# Patient Record
Sex: FEMALE | Race: WHITE
[De-identification: names, ages, dates, MRNs, and addresses within clinical notes are randomized per-mention and may not be internally consistent; named-entity substitution may affect disease eponyms.]

---

## 2017-06-12 ENCOUNTER — HOSPITAL ENCOUNTER (INPATIENT)
Dept: HOSPITAL 10 - OBT | Age: 32
LOS: 4 days | Discharge: HOME | DRG: 781 | End: 2017-06-16
Attending: OBSTETRICS & GYNECOLOGY | Admitting: OBSTETRICS & GYNECOLOGY
Payer: MEDICAID

## 2017-06-12 VITALS
BODY MASS INDEX: 35.9 KG/M2 | HEIGHT: 62 IN | WEIGHT: 195.11 LBS | WEIGHT: 195.11 LBS | HEIGHT: 62 IN | BODY MASS INDEX: 35.9 KG/M2

## 2017-06-12 DIAGNOSIS — N20.0: ICD-10-CM

## 2017-06-12 DIAGNOSIS — Z3A.30: ICD-10-CM

## 2017-06-12 DIAGNOSIS — O26.613: Primary | ICD-10-CM

## 2017-06-12 LAB
ADD SCAN DIFF: NO
ADD UMIC: NO
ALBUMIN SERPL-MCNC: 3.6 G/DL (ref 3.3–4.9)
ALBUMIN/GLOB SERPL: 1.12 {RATIO}
ALP SERPL-CCNC: 114 IU/L (ref 42–121)
ALT SERPL-CCNC: 50 IU/L (ref 13–69)
ANION GAP SERPL CALC-SCNC: 13 MMOL/L (ref 8–16)
AST SERPL-CCNC: 86 IU/L (ref 15–46)
BASOPHILS # BLD AUTO: 0.1 10^3/UL (ref 0–0.1)
BASOPHILS NFR BLD: 0.3 % (ref 0–2)
BILIRUB DIRECT SERPL-MCNC: 0 MG/DL (ref 0–0.2)
BILIRUB SERPL-MCNC: 0.3 MG/DL (ref 0.2–1.3)
BUN SERPL-MCNC: 7 MG/DL (ref 7–20)
CALCIUM SERPL-MCNC: 9.2 MG/DL (ref 8.4–10.2)
CHLORIDE SERPL-SCNC: 106 MMOL/L (ref 97–110)
CO2 SERPL-SCNC: 22 MMOL/L (ref 21–31)
COLOR UR: (no result)
CREAT SERPL-MCNC: 0.5 MG/DL (ref 0.44–1)
EOSINOPHIL # BLD: 0.2 10^3/UL (ref 0–0.5)
EOSINOPHIL NFR BLD: 1.2 % (ref 0–7)
ERYTHROCYTE [DISTWIDTH] IN BLOOD BY AUTOMATED COUNT: 14.3 % (ref 11.5–14.5)
GLOBULIN SER-MCNC: 3.2 G/DL (ref 1.3–3.2)
GLUCOSE SERPL-MCNC: 89 MG/DL (ref 70–220)
GLUCOSE UR STRIP-MCNC: NEGATIVE %
HCT VFR BLD CALC: 38.3 % (ref 37–47)
HGB BLD-MCNC: 13.3 G/DL (ref 12–16)
KETONES UR STRIP.AUTO-MCNC: NEGATIVE MG/DL
LYMPHOCYTES # BLD AUTO: 2.1 10^3/UL (ref 0.8–2.9)
LYMPHOCYTES NFR BLD AUTO: 13.8 % (ref 15–51)
MCH RBC QN AUTO: 30.4 PG (ref 29–33)
MCHC RBC AUTO-ENTMCNC: 34.7 G/DL (ref 32–37)
MCV RBC AUTO: 87.4 FL (ref 82–101)
MONOCYTES # BLD: 1 10^3/UL (ref 0.3–0.9)
MONOCYTES NFR BLD: 6.7 % (ref 0–11)
NEUTROPHILS # BLD: 11.9 10^3/UL (ref 1.6–7.5)
NEUTROPHILS NFR BLD AUTO: 77.6 % (ref 39–77)
NITRITE UR QL STRIP.AUTO: NEGATIVE
NRBC # BLD MANUAL: 0 10^3/UL (ref 0–0)
NRBC BLD QL: 0 /100WBC (ref 0–0)
PLATELET # BLD: 188 10^3/UL (ref 140–415)
PMV BLD AUTO: 11.6 FL (ref 7.4–10.4)
POTASSIUM SERPL-SCNC: 3.8 MMOL/L (ref 3.5–5.1)
PROT SERPL-MCNC: 6.8 G/DL (ref 6.1–8.1)
RBC # BLD AUTO: 4.38 10^6/UL (ref 4.2–5.4)
RBC # UR AUTO: NEGATIVE /UL
SODIUM SERPL-SCNC: 137 MMOL/L (ref 135–144)
UR BILIRUBIN (DIP): NEGATIVE
UR CLARITY: CLEAR
UR TOTAL PROTEIN (DIP): NEGATIVE
UROBILINOGEN UR STRIP-ACNC: (no result) (ref 0.1–1)
WBC # BLD AUTO: 15.4 10^3/UL (ref 4.8–10.8)
WBC # UR STRIP: NEGATIVE /UL

## 2017-06-12 PROCEDURE — 76818 FETAL BIOPHYS PROFILE W/NST: CPT

## 2017-06-12 PROCEDURE — 85025 COMPLETE CBC W/AUTO DIFF WBC: CPT

## 2017-06-12 PROCEDURE — 76817 TRANSVAGINAL US OBSTETRIC: CPT

## 2017-06-12 PROCEDURE — 80053 COMPREHEN METABOLIC PANEL: CPT

## 2017-06-12 PROCEDURE — 76775 US EXAM ABDO BACK WALL LIM: CPT

## 2017-06-12 PROCEDURE — 76705 ECHO EXAM OF ABDOMEN: CPT

## 2017-06-12 PROCEDURE — 81003 URINALYSIS AUTO W/O SCOPE: CPT

## 2017-06-12 PROCEDURE — 87086 URINE CULTURE/COLONY COUNT: CPT

## 2017-06-12 PROCEDURE — 36415 COLL VENOUS BLD VENIPUNCTURE: CPT

## 2017-06-12 PROCEDURE — G0463 HOSPITAL OUTPT CLINIC VISIT: HCPCS

## 2017-06-12 PROCEDURE — 76815 OB US LIMITED FETUS(S): CPT

## 2017-06-12 PROCEDURE — 81001 URINALYSIS AUTO W/SCOPE: CPT

## 2017-06-12 PROCEDURE — 76816 OB US FOLLOW-UP PER FETUS: CPT

## 2017-06-12 RX ADMIN — Medication SCH TAB: at 10:33

## 2017-06-12 RX ADMIN — PYRIDOXINE HYDROCHLORIDE SCH MLS/HR: 100 INJECTION, SOLUTION INTRAMUSCULAR; INTRAVENOUS at 22:46

## 2017-06-12 RX ADMIN — PYRIDOXINE HYDROCHLORIDE SCH MLS/HR: 100 INJECTION, SOLUTION INTRAMUSCULAR; INTRAVENOUS at 18:01

## 2017-06-12 RX ADMIN — PYRIDOXINE HYDROCHLORIDE SCH MLS/HR: 100 INJECTION, SOLUTION INTRAMUSCULAR; INTRAVENOUS at 10:07

## 2017-06-12 NOTE — RADRPT
PROCEDURE:   US Abdomen (right upper quadrant). 

 

CLINICAL INDICATION:    Abdominal pain.

 

TECHNIQUE:   Multiple real-time longitudinal and transverse images of the right upper quadrant of th
e abdomen were acquired utilizing a curved array transducer. Images were reviewed on a high-resoluti
on PACS workstation. 

 

COMPARISON:   None 

 

FINDINGS:

 

The liver is normal in size and demonstrates normal  echogenicity.  No focal intrahepatic mass is id
entified.  The gallbladder contains sludge.  There is no pericholecystic fluid or gallbladder wall t
hickening. No intra or extrahepatic biliary dilatation is seen.  The common bile duct measures 4.5 m
m in maximal dimension. The portal and hepatic veins are patent demonstrating normal directional niya
w. The visualized portions of the pancreas are unremarkable with obscuration of the tail of the panc
reas.  No free fluid is identified. 

 

The right kidney measures 9.9 cm in length.  There is normal echogenicity within the right kidney.  
There is no perinephric fluid collection.  No hydronephrosis, mass, or calculus is seen.   

 

IMPRESSION:

 

Small amount of sludge within the gallbladder.  Otherwise, unremarkable right upper quadrant ultraso
und. 

 

  

RPTAT: HH

_____________________________________________ 

.Brittney Mac MD, MD           Date    Time 

Electronically viewed and signed by .Brittney Mac MD, MD on 06/12/2017 05:28 

 

D:  06/12/2017 05:28  T:  06/12/2017 05:28

.G/

## 2017-06-12 NOTE — HP
Date/Time of Note


Date/Time of Note


DATE: 17 


TIME: 06:54





OB - History


Hx of Present Pregnancy


:  1


Para:  0


Prenatal Care:  Good Care


Obstetrical Complications:  None


Medical Complications:  None





Past Family/Social History


*


Past Medical, Surgical, Family and Obstetric Histories reviewed from prenatal 

chart.





OB  Admission Exam


Physical Exam


Abdomen:  WNL


Extremities:  Normal


Cervical Dilatation:  None


Effacement:  0%


Fetal Heart Rate:  140's


Accelerations:  Accelerations Present


Decelerations:  No Decelerations


Varibility:  Moderate


Contractions on Admission:  None


Last 72 hours Lab Results


 CBC & BMP


17 04:20











 Liver Function








Test


  17


04:20


 


Alanine Aminotransferase


(ALT/SGPT) 50  


 


 


Albumin 3.6  


 


Alkaline Phosphatase 114  


 


Aspartate Amino Transf


(AST/SGOT) 86  H


 


 


Direct Bilirubin 0.00  


 


Total Protein 6.8  











OB  Assessment/Plan


Reason for admission:  observation


Plan:  Expectant Management


Other plan:


+CVA tenderness


WBC 15


Suspected Pyelonephritis





1.Ancef


2.IV hydration


3.Prenatalogy consult











YANET MCCABE M.D. 2017 06:56

## 2017-06-12 NOTE — RADRPT
PROCEDURE:   US Renal 

 

CLINICAL INDICATION:   Back pain rule out kidney stone 

 

TECHNIQUE:   Multiple sonographic images of the kidneys and bladder were obtained.  Evaluation of th
e kidneys and bladder was performed as well with gray scale and color and Doppler evaluation using a
 curved array transducer.  The images were reviewed on a high-resolution PACS workstation. 

 

COMPARISON:   Comparison previous right upper quadrant abdominal sonogram done earlier on the same d
ate 

 

FINDINGS:

There is a linear focus of increased echotexture measuring 0.8 cm in length with posterior acoustic 
shadowing suspicious for calcification which could be vascular or could represent a nonobstructing n
ephrolith.  No mass or hydronephrosis is evident.  There is mild left pelvocaliectasis with no intra
 renal calcification or mass identified. 

 

The bladder appears unremarkable. There is a single fetus in a cephalic presentation noted.

 

IMPRESSION: 

1.  And 8 mm linear focus of increased echotexture with posterior acoustic shadowing is seen within 
the mid pole of the right kidney which was not appreciable on the previous study.  This could repres
ent a nonobstructing nephrolith or possibly a vascular calcification.

2.  Mild left pelvocaliectasis with no stone or mass seen within the left kidney.

3.  The patient is pregnant with the fetus cephalic in presentation.

4.  The bladder appears unremarkable.

_____________________________________________ 

Physician Chris           Date    Time 

Electronically viewed and signed by Physician Chris on 06/12/2017 13:33 

 

D:  06/12/2017 13:33  T:  06/12/2017 13:33

/

## 2017-06-12 NOTE — RADRPT
PROCEDURE:   OB ultrasound for biophysical profile 

 

CLINICAL INDICATION:    labor

 

TECHNIQUE:   Multiple sonographic images of the pelvis were obtained.  Transabdominal views of the g
ravid uterus are available for review.  The images were reviewed on a PACS workstation.  

 

COMPARISON:   None 

 

FINDINGS:

 

Fetal breathing movement = 2/2

Fetal tone = 2/2

Fetal motion = 2/2

AMITA = 2/2

 

AMITA = 11.3 cm

Single live intrauterine pregnancy with fetal cardiac activity of 134 bpm.  Fetal position is cephal
ic.  The placenta is fundal. The cervix is closed with a length of 4.3 cm.

 

IMPRESSION:

 

1.  Single live intrauterine gestation.  

2.  Biophysical profile = 8/8.

3.  AMITA = 11.3 cm. 

4.  The cervix is closed with a length of 4.3 cm.

 

 

RPTAT: HH

_____________________________________________ 

.Brittney Mac MD, MD           Date    Time 

Electronically viewed and signed by .Brittney Mac MD, MD on 2017 05:24 

 

D:  2017 05:24  T:  2017 05:24

.G/

## 2017-06-12 NOTE — RADRPT
PROCEDURE:   US OB. 

 

CLINICAL INDICATION:   Size and dates

 

TECHNIQUE:   Multiple sonographic images of the pelvis were obtained.  Transabdominal imaging only w
as performed.  The images were reviewed on a PACS workstation. 

 

COMPARISON:   No prior studies are available for comparison. 

 

FINDINGS:

 

There is a single live intrauterine gestation.  Cardiac activity is present with 137 beats per minut
e. 

Fetal position is cephalic. 

 

Measurements were made in order to determine fetal age. The results are as follows:

BPD = 8.08 cm

HC =  28.53 cm

AC =  25.79 cm

FL =  5.67 cm.

Estimated gestational age of approximately 30 weeks 6 days.  

The estimated date of delivery is 08/15/2017.  

The EFW = 1532 g, 3.7 %ile.  

 

The placenta is fundal. There is no evidence for an abruption or placenta previa.

 

 

IMPRESSION:

 

1.  Single live intrauterine gestation of approximately 30 weeks 6 days, by ultrasound criteria.  

2.  The estimated date of delivery is 08/15/2017.

3.  The estimated fetal weight is 1532 g, 3.7 %ile. 

 

 

 

RPTAT: HH

_____________________________________________ 

.Brittney Mac MD, MD           Date    Time 

Electronically viewed and signed by .rBittney Mac MD, MD on 06/12/2017 05:26 

 

D:  06/12/2017 05:26  T:  06/12/2017 05:26

.G/

## 2017-06-13 RX ADMIN — PYRIDOXINE HYDROCHLORIDE SCH MLS/HR: 100 INJECTION, SOLUTION INTRAMUSCULAR; INTRAVENOUS at 02:07

## 2017-06-13 RX ADMIN — PYRIDOXINE HYDROCHLORIDE SCH MLS/HR: 100 INJECTION, SOLUTION INTRAMUSCULAR; INTRAVENOUS at 19:12

## 2017-06-13 RX ADMIN — Medication SCH TAB: at 08:56

## 2017-06-13 RX ADMIN — OXYCODONE HYDROCHLORIDE AND ACETAMINOPHEN PRN TAB: 10; 325 TABLET ORAL at 21:21

## 2017-06-13 RX ADMIN — PYRIDOXINE HYDROCHLORIDE SCH MLS/HR: 100 INJECTION, SOLUTION INTRAMUSCULAR; INTRAVENOUS at 10:17

## 2017-06-13 NOTE — CONS
Date/Time of Note


Date/Time of Note


DATE: 6/13/17 


TIME: 16:18





Assessment/Plan


Assessment/Plan


Additional Assessment/Plan


30 yo F 32 weeks into her first pregnancy presents with 3 days of abd pain now 

improving. Etiology unclear. UA not consistent with infection. Gallbladder US 

with sludge but no current evidence of cholecystitis or duct compromise. 

Consider possible recent passage of GB stone? Imaging also revealed a small 

nonobstructing kidney stone, consider this as cause of patient's discomfort?





PLAN


stop abx as no evidence of bacterial infection


strain urine to look for kidney stone


repeat CBC with AM labs





POC dw pt in detail


hospitalist service will cont to follow





Consultation Date/Type/Reason


Admit Date/Time


Jun 12, 2017 at 07:02


Type of Consultation:  hospitalist


Reason for Consultation


abd pain





Hx of Present Illness


30 yo F at 32 weeks of pregnancy presented 6.12 with c/o several days of R 

sided abd pain. Pt reports pain began at 2am 6.10. Pain mostly in R lower 

quadrant also radiates to low back at times. No fevers or chills. No nausea or 

vomiting. Had some constipation but this has since resolved. Also states that 

during episodes of particularly intense pain that her hands swell as well. No 

new food exposures recently. No dysuria or hematuria.


10p ROS neg except as per HPI





Past Medical History


PMHx: none aside from pregnancy


Meds: PNV


Soc Hx: lives in the community





Exam/Review of Systems


Vital Signs


Vitals











 Intake and Output   


 


 6/12/17 6/12/17 6/13/17





 15:00 23:00 07:00


 


Intake Total 675 ml 2100 ml 1040 ml


 


Output Total  1500 ml 1600 ml


 


Balance 675 ml 600 ml -560 ml











Exam


nad, pleasant, sitting up in bed


MMM


EOMI


rrr no mrg


lungs clear


abd gravid, minimal TTP over RLQ, no rebound or guarding. no CVA tenderness bl


no le edema


no rashes





labs reviewed, WBCs 15s. Abd US with GB sludge





Results


Result Diagram:  


6/12/17 0420                                                                   

             6/12/17 0420








Medications


Medications





 Current Medications


Lactated Ringer's 1,000 ml @  125 mls/hr Q8H IV  Last administered on 6/13/17at 

10:17; Admin Dose 125 MLS/HR;  Start 6/12/17 at 06:46


Cefazolin Sodium/ Dextrose (Ancef 2 Gm/50 ml (Pmx)) 50 ml @  100 mls/hr Q8 IV  

Last administered on 6/13/17at 14:34; Admin Dose 100 MLS/HR;  Start 6/12/17 at 

07:00


Prenat Multivit/ /Iron/Folic Ac (Prenatal S) 1 tab DAILY PO  Last 

administered on 6/13/17at 08:56; Admin Dose 1 TAB;  Start 6/12/17 at 09:00


Acetaminophen (Tylenol Tab) 650 mg Q4H  PRN PO PAIN AND OR ELEVATED TEMP Last 

administered on 6/13/17at 07:40; Admin Dose 650 MG;  Start 6/12/17 at 07:00





Procedures


Procedures


UA benign


KIA with small nonobstructive stone











NEIL HERNANDEZ MD Jun 13, 2017 16:18

## 2017-06-13 NOTE — QN
Documentation


Comment


patient seen and evaluated


complains of occasion upper/lower abdominal pain


no n/v, sob,


vs stable


ab gravid mild upper/lower abdominal tenderness (pt report improvement since 

admission)


extr no edema no calf tenderness





a/ iup at 30 wks ga, sludge in gullbladder, elevated wbc





p/ f/u cbc in am


social work consult











JENNYFER FRANCIS MD Jun 13, 2017 19:15

## 2017-06-13 NOTE — CONS
Date/Time of Note


Date/Time of Note


DATE: 17 


TIME: 16:21





Consultation Date/Type/Reason


Admit Date/Time


2070


Hospital OB consult


Reason for Consultation


This patient is a 31 years old  1 para 0 estimated date of confinement 

of 1977 which make her about 38 weeks.


She came to the hospital yesterday complaining of right side pain for the past 

3 days


No nausea vomiting diarrhea no vaginal bleeding no dysuria


On general examination she is well-developed well-nourished  lady 

pregnant at midterm in no acute distress except for the complain of the right 

side and back pain


Her abdomen is soft fundus is soft fetal heart tone is normal with good 

variability no deceleration





 Current Medications








 Medications


  (Trade)  Dose


 Ordered  Sig/Ji


 Route


 PRN Reason  Start Time


 Stop Time Status Last Admin


Dose Admin


 


 Acetaminophen 650


 mg  650 mg  ONCE  ONCE


 PO


   17 06:00


 17 06:01 DC 17 05:45


650 MG


 


 Lactated Ringer's  1,000 ml @ 


 125 mls/hr  Q8H


 IV


   17 06:46


    17 10:17


125 MLS/HR


 


 Cefazolin Sodium/


 Dextrose


  (Ancef 2 Gm/50


 ml (Pmx))  50 ml @ 


 100 mls/hr  Q8


 IV


   17 07:00


 17 16:17 DC 17 14:34


100 MLS/HR


 


 Prenat Multivit/


 [place]/Iron/Folic


 Ac


  (Prenatal S)  1 tab  DAILY


 PO


   17 09:00


    17 08:56


1 TAB


 


 Acetaminophen


  (Tylenol Tab)  650 mg  Q4H  PRN


 PO


 PAIN AND OR ELEVATED TEMP  17 07:00


    17 07:40


650 MG








Constitutional:  No chills, No diaphoresis, No disoriented, No febrile, No 

improved, No no complaints, No other, No poor po, No requiring IVF, No 

requiring O2


Eyes:  other (No Camryn), 


   No discharge, No no complaints, No pain, No redness, No visual change


ENT:  No bleeding, No congestion, No discharge, No dysphagia, No no complaints, 

No other, No pain, No sore throat


Respiratory:  other (Chest is clear no rales), 


   No cough, No no complaints, No pain, No pleuritic pain, No shortness of 

breath, No sputum, No wheezing


Cardiovascular:  No chest pain, No edema, No lightheadedness, No no complaints, 

No orthopenea, No other, No palpitations, No paroxysmal nocturnal dyspnea


Gastrointestinal:  other (As I mentioned no diarrhea no vomiting), 


   No blood, No constipation, No decreased appetite, No diarrhea, No flatus, No 

nausea, No no complaints, No pain, No passing stool, No vomiting


Genitourinary:  other (No vaginal bleeding no discharge), 


   No bleeding, No discharge, No dysuria, No flank pain, No hematuria, No no 

complaints


Musculoskeletal:  other, swelling


Skin:  rash (No rash no petechiae), 


   No bruising, No erythema, No laceration, No no complaints, No other, No 

pruritis, No skin lesions


Neurologic:  other (Knee-jerk reflexes normal), 


   No confusion, No dizziness, No focal-weakness, No headache, No no complaints

, No seizure, No syncope


Endocrine:  No dry skin, No no complaints, No other, No polydypsia, No polyuria

, No temp intolerance


Lymphatic:  No adenopathy, No lymphadema, No no complaints, No other, No tender 

nodes


Additional Comments


On the studies that was done here ,her ultrasound  report was a single live 

intrauterine gestation with heart rate of 137 her biophysical profile was 

reported  estimated fetal weight was 1532 g and the gestational age was 

appropriate for the date which is 30 weeks and 6 days 


AMITA 11.3 , biophysical profile  the cervix was 4.3 cm long and closed,


On renal study by ultrasound report is mild left pelvocaliectasis with no stone 

or mass within the left kidney bladder was normal


On abdominal ultrasound the liver was normal size with normal echogenicity no 

focal intrahepatic mass ,small amount of sludge within the gallbladder 

otherwise unremarkable right upper quadrant ultrasound


Ultrasound study regarding her pregnancy was completely within normal range





Impression: Normal intrauterine pregnancy of 30 weeks


Rule out cholecystitis or any other related condition


We will request internal medicine consult





Exam/Review of Systems


Vital Signs


Vitals











 Intake and Output   


 


 17





 15:00 23:00 07:00


 


Intake Total 675 ml 2100 ml 1040 ml


 


Output Total  1500 ml 1600 ml


 


Balance 675 ml 600 ml -560 ml











Results


Result Diagram:  


17 0420                                                                   

             17 0420








Medications


Medications





 Current Medications


Lactated Ringer's (Lr) 1,000 ml @  125 mls/hr Q8H IV  Last administered on  10:17; Admin Dose 125 MLS/HR;  Start 17 at 06:46


Prenat Multivit/ /Iron/Folic Ac (Prenatal S) 1 tab DAILY PO  Last 

administered on  08:56; Admin Dose 1 TAB;  Start 17 at 09:00


Acetaminophen (Tylenol Tab) 650 mg Q4H  PRN PO PAIN AND OR ELEVATED TEMP Last 

administered on  07:40; Admin Dose 650 MG;  Start 17 at 07:00











HIPOLITO COPPOLA MD 2017 16:38

## 2017-06-14 LAB
ADD SCAN DIFF: NO
ADD UMIC: YES
BACTERIA #/AREA URNS HPF: (no result) /[HPF]
BASOPHILS # BLD AUTO: 0 10^3/UL (ref 0–0.1)
BASOPHILS NFR BLD: 0.2 % (ref 0–2)
COLOR UR: (no result)
EOSINOPHIL # BLD: 0.2 10^3/UL (ref 0–0.5)
EOSINOPHIL NFR BLD: 1.8 % (ref 0–7)
ERYTHROCYTE [DISTWIDTH] IN BLOOD BY AUTOMATED COUNT: 14.6 % (ref 11.5–14.5)
GLUCOSE UR STRIP-MCNC: NEGATIVE %
HCT VFR BLD CALC: 36.4 % (ref 37–47)
HGB BLD-MCNC: 12.4 G/DL (ref 12–16)
KETONES UR STRIP.AUTO-MCNC: NEGATIVE MG/DL
LYMPHOCYTES # BLD AUTO: 2.2 10^3/UL (ref 0.8–2.9)
LYMPHOCYTES NFR BLD AUTO: 18.3 % (ref 15–51)
MCH RBC QN AUTO: 30.5 PG (ref 29–33)
MCHC RBC AUTO-ENTMCNC: 34.1 G/DL (ref 32–37)
MCV RBC AUTO: 89.7 FL (ref 82–101)
MONOCYTES # BLD: 0.8 10^3/UL (ref 0.3–0.9)
MONOCYTES NFR BLD: 6.5 % (ref 0–11)
NEUTROPHILS # BLD: 8.9 10^3/UL (ref 1.6–7.5)
NEUTROPHILS NFR BLD AUTO: 72.8 % (ref 39–77)
NITRITE UR QL STRIP.AUTO: NEGATIVE
NRBC # BLD MANUAL: 0 10^3/UL (ref 0–0)
NRBC BLD QL: 0 /100WBC (ref 0–0)
PLATELET # BLD: 152 10^3/UL (ref 140–415)
PMV BLD AUTO: 11.9 FL (ref 7.4–10.4)
RBC # BLD AUTO: 4.06 10^6/UL (ref 4.2–5.4)
RBC # UR AUTO: (no result) /UL
RBC #/AREA URNS HPF: (no result) /HPF
SQUAMOUS #/AREA URNS HPF: (no result) /[HPF]
UR BILIRUBIN (DIP): NEGATIVE
UR CLARITY: CLEAR
UR TOTAL PROTEIN (DIP): NEGATIVE
UROBILINOGEN UR STRIP-ACNC: (no result) (ref 0.1–1)
WBC # BLD AUTO: 12.2 10^3/UL (ref 4.8–10.8)
WBC # UR STRIP: (no result) /UL

## 2017-06-14 RX ADMIN — OXYCODONE HYDROCHLORIDE AND ACETAMINOPHEN PRN TAB: 10; 325 TABLET ORAL at 12:24

## 2017-06-14 RX ADMIN — PYRIDOXINE HYDROCHLORIDE SCH MLS/HR: 100 INJECTION, SOLUTION INTRAMUSCULAR; INTRAVENOUS at 03:39

## 2017-06-14 RX ADMIN — Medication SCH TAB: at 09:29

## 2017-06-14 RX ADMIN — OXYCODONE HYDROCHLORIDE AND ACETAMINOPHEN PRN TAB: 10; 325 TABLET ORAL at 02:15

## 2017-06-14 RX ADMIN — OXYCODONE HYDROCHLORIDE AND ACETAMINOPHEN PRN TAB: 10; 325 TABLET ORAL at 16:04

## 2017-06-14 RX ADMIN — OXYCODONE HYDROCHLORIDE AND ACETAMINOPHEN PRN TAB: 10; 325 TABLET ORAL at 06:30

## 2017-06-14 NOTE — CONS
DATE OF ADMISSION: 2017

DATE OF CONSULTATION:  2017

 

 

 

REASON FOR CONSULTATION:  Abdominal pain.

 

HISTORY OF PRESENT ILLNESS:  The patient is a 31-year-old female,  1, para 0, who is 30 weeks
 pregnant who presented initially complaining of abdominal pain of approximately 5 days in duration.
  She describes the pain as being located in the epigastric area, radiating towards the right upper 
and right lower quadrants and suprapubic area.  She also reports some vomiting; however, she states 
that she has been vomiting pretty much throughout her pregnancy.  She denies any diarrhea or constip
ation or fever/chills.  On arrival, she was found to have a white blood cell count of 15.4.  She was
 therefore admitted and started on IV antibiotics.  An abdominal ultrasound was done, which showed a
 small amount of sludge within the gallbladder and a possible right kidney stone, otherwise negative
.  The patient has continued to complain of abdominal pain, which is controlled with oral Percocet. 
 She has been tolerating a diet.  She has been afebrile since she has been here.  Otherwise, accordi
ng to the patient, her pregnancy has been progressing normally.

 

PAST MEDICAL HISTORY:  Otherwise noncontributory.

 

PAST SURGICAL HISTORY:  Otherwise negative.

 

MEDICATIONS:  Please refer to medication reconciliation.

 

ALLERGIES:  NO KNOWN DRUG ALLERGIES.

 

SOCIAL HISTORY:  The patient does not smoke, drink, or do any illicit drugs.

 

REVIEW OF SYSTEMS:  A 14-point review of systems was conducted and was negative except for that whic
h was mentioned in the HPI.

 

PHYSICAL EXAMINATION:

VITAL SIGNS:  The patient is afebrile and hemodynamically stable.

GENERAL APPEARANCE:  She is a well-developed, well-nourished, obese  female who is awake, al
ert, and oriented x3 and in no acute distress at this time.

HEENT:  Pupils are equal and reactive to light and accommodation.  There is no scleral icterus.  Ski
n is not jaundiced.

NECK:  Supple without JVD.

CARDIOVASCULAR:  S1, S2.  Regular rate and rhythm.  No murmurs appreciated.

RESPIRATORY:  Clear to auscultation bilaterally.

ABDOMEN:  Soft, gravid.  Bowel sounds are present.  There is mild epigastric tenderness to palpation
 with right lower quadrant and right upper quadrant tenderness to palpation.  There is no involuntar
y guarding or evidence of diffuse peritonitis.

EXTREMITIES:  Do not exhibit any signs of cyanosis, edema, or clubbing.

 

IMAGING STUDIES:  Abdominal ultrasound is as mentioned in HPI with a small amount of sludge in the g
allbladder, otherwise unremarkable.  Renal ultrasound shows a possible 8 mm stone in the mid pole of
 the right kidney.

 

RECOMMENDATIONS:  This is a 31-year-old female who is 30 weeks pregnant with abdominal pain.

 

Given the duration of the patient's symptoms and clinical findings, my suspicion for acute appendici
tis is low at this time, however, it cannot be completely ruled out.  The patient states she has bee
n better since she has been admitted to the hospital.  She is tolerating a diet.  She has been afebr
ile and her leukocytosis has been improving.  I would continue with serial abdominal exams and close
 observation.  Her urinalysis was negative though her urine culture grew mixed gram-positive jenna. 
 This is likely contaminate and consideration should be given to repeat of clean catch urine.  Other
 causes of the patient's pain may include gastritis, biliary dyskinesia, gastrointestinal viral synd
gagan, ligamentous or musculoskeletal pain, etc.  I do not believe surgical intervention is required 
at this time.  I will continue with careful observation of this patient.  If the patient's symptoms 
did not improve, then MRI versus a CT scan should be considered for further evaluation.  Both of the
se have been proven to be safe in pregnancy during all trimesters through multiple studies.

 

The above was discussed with the patient and the nurse at the bedside.  I ensured that all the patie
nt's questions were answered.  Further recommendations will be made based on the patient's clinical 
course.

 

 

Dictated By: JENNYFER TEMPLE/EDWIGE

DD:    2017 19:00:33

DT:    2017 20:49:12

Conf#: [id number]

DID#:  [id number]

CC: JENNYFER FRANCIS MD;*EndCC*

## 2017-06-14 NOTE — RADRPT
PROCEDURE:   Obstetrical ultrasound

 

CLINICAL INDICATION:   possible iugr

 

TECHNIQUE:   Multiple sonographic images of the pelvis were obtained.  The images were reviewed on a
 PACS workstation. 

 

COMPARISON:   None

 

FINDINGS:

The cervix is not well visualized.

There is a single viable intrauterine gestation.  

Cardiac activity is present with 131 beats per minute. 

There is a vertex presentation. 

 

The placenta is fundal. There is no evidence for an abruption or placenta previa.

There is a normal amount of amniotic fluid with an AMITA = 9.4 cm.

 

Measurements were made in order to determine fetal age. The results are as follows (cm):

BPD =7.88

HC   =28.35

AC   =26.03

FL    =5.88

Estimated gestational age by ultrasound of approximately 30 weeks, 6 days.  

The estimated date of delivery by ultrasound is 08/17/2017.  

Estimated gestational age by LMP of approximately 30 weeks, 2 days.

The estimated date of delivery by LMP is 08/21/2017.

 

EFW = 1591  grams (45th percentile)

 

 

IMPRESSION:

Single viable intrauterine gestation of approximately 30 weeks, 6 days .  

The estimated date of delivery is 08/17/2017 .

Dating by ultrasound is within 4 days of dating by LMP.

 

Cephalic presentation.

 

Normal AMITA.

 

Estimated fetal weight is in the 45th percentile.

 

 

RPTAT: EE

_____________________________________________ 

Physician Catina           Date    Time 

Electronically viewed and signed by Physician Catina on 06/14/2017 17:24 

 

D:  06/14/2017 17:24  T:  06/14/2017 17:24

KARMEN/

## 2017-06-14 NOTE — CONS
Date/Time of Note


Date/Time of Note


DATE: 6/14/17 


TIME: 16:56





Assessment/Plan


Assessment/Plan


Additional Assessment/Plan


30 yo F at 32 weeks of pregnancy presented 6.12 with c/o several days of R 

sided abd pain. Pt reports pain began at 2am 6.10. Pain mostly in R lower 

quadrant also radiates to low back at times. No fevers or chills. No nausea or 

vomiting. Had some constipation but this has since resolved. Also states that 

during episodes of particularly intense pain that her hands swell as well. No 

new food exposures recently. No dysuria or hematuria. 





Pain now improving but etiology still unclear. 


As pt tolerating PO I have stopped IVFs


As no infectious focus found, I have stopped antibiotics


Consider monitoring pt for additional 24 hours and if stable consider discharge.


Will recheck CBC and CMP in AM





Consultation Date/Type/Reason


Admit Date/Time


Jun 12, 2017 at 07:02


Initial Consult Date





Type of Consultation:  hospitalist





24 HR Interval Summary


Free Text/Dictation


Pt reports abd improving but still present at night





Exam/Review of Systems


Vital Signs


Vitals











 Intake and Output   


 


 6/13/17 6/13/17 6/14/17





 15:00 23:00 07:00


 


Intake Total 1230 ml 800 ml 1815 ml


 


Output Total 600 ml 250 ml 1650 ml


 


Balance 630 ml 550 ml 165 ml











Exam


nad, sitting up in bed


no mrg


lungs clear


gravid, mild ttp RLQ


no le edema


WBCs improving





Results


Result Diagram:  


6/14/17 1311                                                                   

             6/12/17 0420





Results 24 hrs





Laboratory Tests








Test


  6/14/17


13:11


 


White Blood Count 12.2  #H


 


Red Blood Count 4.06  L


 


Hemoglobin 12.4  


 


Hematocrit 36.4  L


 


Mean Corpuscular Volume 89.7  


 


Mean Corpuscular Hemoglobin 30.5  


 


Mean Corpuscular Hemoglobin


Concent 34.1  


 


 


Red Cell Distribution Width 14.6  H


 


Platelet Count 152  


 


Mean Platelet Volume 11.9  H


 


Neutrophils % 72.8  


 


Lymphocytes % 18.3  


 


Monocytes % 6.5  


 


Eosinophils % 1.8  


 


Basophils % 0.2  


 


Nucleated Red Blood Cells % 0.0  


 


Neutrophils # 8.9  H


 


Lymphocytes # 2.2  


 


Monocytes # 0.8  


 


Eosinophils # 0.2  


 


Basophils # 0.0  


 


Nucleated Red Blood Cells # 0.0  











Medications


Medications





 Current Medications


Prenat Multivit/ [place]/Iron/Folic Ac (Prenatal S) 1 tab DAILY PO  Last 

administered on 6/14/17at 09:29; Admin Dose 1 TAB;  Start 6/12/17 at 09:00


Acetaminophen (Tylenol Tab) 650 mg Q4H  PRN PO PAIN AND OR ELEVATED TEMP Last 

administered on 6/13/17at 07:40; Admin Dose 650 MG;  Start 6/12/17 at 07:00


Oxycodone/ Acetaminophen (Endocet (10/ 325)) 1 tab Q4H  PRN PO PAIN Last 

administered on 6/14/17at 16:04; Admin Dose 1 TAB;  Start 6/13/17 at 20:00


Al Hydrox/Mg Hydrox/Simethicone (Mag-Al Plus) 30 ml Q4H  PRN PO 

GASTROINTESTINAL UPSET Last administered on 6/14/17at 16:05; Admin Dose 30 ML;  

Start 6/13/17 at 20:00











NEIL HERNANDEZ MD Jun 14, 2017 16:57

## 2017-06-14 NOTE — RADRPT
PROCEDURE:   US OB biophysical profile. 

 

CLINICAL INDICATION:    Fetal evaluation

 

TECHNIQUE:   Multiple sonographic images of the pelvis were obtained.  The images were reviewed on a
 PACS workstation. 

 

COMPARISON:   Obstetrical ultrasound from 06/12/2017 

 

FINDINGS:

 

There is a single viable intrauterine gestation.  Cardiac activity is present with 134 beats per min
[place]. 

There is a vertex presentation. 

The placenta is fundal.   There is no evidence of placental abruption.

There is a normal amount of amniotic fluid with an AMITA = 9.4 cm.

 

Biophysical profile:

Fetal movement 2/2

Fetal tone 2/2.

Fetal breathing 2/2 

AMITA 2/2

 

Total 8/8 

 

RPTAT: AA . 

 

IMPRESSION:

Normal biophysical profile.

_____________________________________________ 

Physician Catina           Date    Time 

Electronically viewed and signed by Physician Catina on 06/14/2017 17:23 

 

D:  06/14/2017 17:23  T:  06/14/2017 17:23

KARMEN/

## 2017-06-15 LAB
ADD SCAN DIFF: NO
ALBUMIN SERPL-MCNC: 3.6 G/DL (ref 3.3–4.9)
ALBUMIN/GLOB SERPL: 1.28 {RATIO}
ALP SERPL-CCNC: 124 IU/L (ref 42–121)
ALT SERPL-CCNC: 44 IU/L (ref 13–69)
ANION GAP SERPL CALC-SCNC: 10 MMOL/L (ref 8–16)
AST SERPL-CCNC: 72 IU/L (ref 15–46)
BASOPHILS # BLD AUTO: 0 10^3/UL (ref 0–0.1)
BASOPHILS NFR BLD: 0.2 % (ref 0–2)
BILIRUB DIRECT SERPL-MCNC: 0 MG/DL (ref 0–0.2)
BILIRUB SERPL-MCNC: 0.3 MG/DL (ref 0.2–1.3)
BUN SERPL-MCNC: 6 MG/DL (ref 7–20)
CALCIUM SERPL-MCNC: 9 MG/DL (ref 8.4–10.2)
CHLORIDE SERPL-SCNC: 105 MMOL/L (ref 97–110)
CO2 SERPL-SCNC: 25 MMOL/L (ref 21–31)
CREAT SERPL-MCNC: 0.56 MG/DL (ref 0.44–1)
EOSINOPHIL # BLD: 0.3 10^3/UL (ref 0–0.5)
EOSINOPHIL NFR BLD: 2.4 % (ref 0–7)
ERYTHROCYTE [DISTWIDTH] IN BLOOD BY AUTOMATED COUNT: 14.6 % (ref 11.5–14.5)
GLOBULIN SER-MCNC: 2.8 G/DL (ref 1.3–3.2)
GLUCOSE SERPL-MCNC: 74 MG/DL (ref 70–220)
HCT VFR BLD CALC: 37.3 % (ref 37–47)
HGB BLD-MCNC: 12.5 G/DL (ref 12–16)
LYMPHOCYTES # BLD AUTO: 2.5 10^3/UL (ref 0.8–2.9)
LYMPHOCYTES NFR BLD AUTO: 21.7 % (ref 15–51)
MCH RBC QN AUTO: 29.8 PG (ref 29–33)
MCHC RBC AUTO-ENTMCNC: 33.5 G/DL (ref 32–37)
MCV RBC AUTO: 89 FL (ref 82–101)
MONOCYTES # BLD: 0.8 10^3/UL (ref 0.3–0.9)
MONOCYTES NFR BLD: 7.2 % (ref 0–11)
NEUTROPHILS # BLD: 7.7 10^3/UL (ref 1.6–7.5)
NEUTROPHILS NFR BLD AUTO: 68.1 % (ref 39–77)
NRBC # BLD MANUAL: 0 10^3/UL (ref 0–0)
NRBC BLD QL: 0 /100WBC (ref 0–0)
PLATELET # BLD: 152 10^3/UL (ref 140–415)
PMV BLD AUTO: 11.8 FL (ref 7.4–10.4)
POTASSIUM SERPL-SCNC: 3.8 MMOL/L (ref 3.5–5.1)
PROT SERPL-MCNC: 6.4 G/DL (ref 6.1–8.1)
RBC # BLD AUTO: 4.19 10^6/UL (ref 4.2–5.4)
SODIUM SERPL-SCNC: 136 MMOL/L (ref 135–144)
WBC # BLD AUTO: 11.3 10^3/UL (ref 4.8–10.8)

## 2017-06-15 RX ADMIN — DOCUSATE SODIUM SCH MG: 100 CAPSULE, LIQUID FILLED ORAL at 20:21

## 2017-06-15 RX ADMIN — Medication SCH TAB: at 09:13

## 2017-06-15 NOTE — PN
Date/Time of Note


Date/Time of Note


DATE: 6/15/17 


TIME: 00:21





OB Subjective


Subjective


Subjective


Patient denies any fever or chills.  Denies any leaking of fluid, vaginal 

bleeding or decreased fetal movement.  She denies any urinary symptoms.  Still 

complaining of pain in the right upper and right mid abdomen with radiation to 

the back.  Patient receiving Percocet for pain that improves her pain.  She is 

concerned about possibility of appendicitis.  Had vomiting last night.  Today 

he had some nausea but could be able to tolerate diet.


Has been seen by GI  This morning.





OB Objective


Objective


Objective


General appearance: Alert and oriented 4.  Patient appears to be in mild to 

moderate distress


Abdomen: Soft, gravid, fundal height consistent with gestational age.


Tenderness in the right upper quadrant and in the right mid quadrant of the 

abdomen noted.  there is moderate rebound tenderness, no guarding, no rigidity 

no evidence of acute abdomen.


No uterine tenderness extremities: No calf tenderness, no click, no cords 

palpable


NST: Category 1


Occasional rare contractions or irritability noted patient denies feeling





 Hematology - 72 Hrs








Test


  6/12/17


04:20 6/14/17


13:11


 


White Blood Count


  15.410^3/ul


(4.8-10.8)  H 12.210^3/ul


(4.8-10.8)  #H


 


Red Blood Count


  4.3810^6/ul


(4.20-5.40) 4.0610^6/ul


(4.20-5.40)  L


 


Hemoglobin


  13.3g/dl


(12.0-16.0) 12.4g/dl


(12.0-16.0)


 


Hematocrit


  38.3%


(37.0-47.0) 36.4%


(37.0-47.0)  L


 


Mean Corpuscular Volume


  87.4fl


(82.0-101.0) 89.7fl


(82.0-101.0)


 


Mean Corpuscular Hemoglobin


  30.4pg


(29.0-33.0) 30.5pg


(29.0-33.0)


 


Mean Corpuscular Hemoglobin


Concent 34.7g/dl


(32.0-37.0) 34.1g/dl


(32.0-37.0)


 


Red Cell Distribution Width


  14.3%


(11.5-14.5) 14.6%


(11.5-14.5)  H


 


Platelet Count


  96653^3/UL


(140-415) 35241^3/UL


(140-415)


 


Mean Platelet Volume


  11.6fl


(7.4-10.4)  H 11.9fl


(7.4-10.4)  H


 


Neutrophils %


  77.6%


(39.0-77.0)  H 72.8%


(39.0-77.0)


 


Lymphocytes %


  13.8%


(15.0-51.0)  L 18.3%


(15.0-51.0)


 


Monocytes %


  6.7%


(0.0-11.0) 6.5%


(0.0-11.0)


 


Eosinophils %


  1.2% (0.0-7.0)


  1.8% (0.0-7.0)


 


 


Basophils %


  0.3% (0.0-2.0)


  0.2% (0.0-2.0)


 


 


Nucleated Red Blood Cells %


  0.0/100WBC


(0.0-0.0) 0.0/100WBC


(0.0-0.0)


 


Neutrophils #


  11.910^3/ul


(1.6-7.5)  H 8.910^3/ul


(1.6-7.5)  H


 


Lymphocytes #


  2.110^3/ul


(0.8-2.9) 2.210^3/ul


(0.8-2.9)


 


Monocytes #


  1.010^3/ul


(0.3-0.9)  H 0.810^3/ul


(0.3-0.9)


 


Eosinophils #


  0.210^3/ul


(0.0-0.5) 0.210^3/ul


(0.0-0.5)


 


Basophils #


  0.110^3/ul


(0.0-0.1) 0.010^3/ul


(0.0-0.1)


 


Nucleated Red Blood Cells #


  0.010^3/ul


(0.0-0.0) 0.010^3/ul


(0.0-0.0)








Chemistry








Test


  6/12/17


04:20


 


Sodium Level


  137mmol/L


(135-144)


 


Potassium Level


  3.8mmol/L


(3.5-5.1)


 


Chloride Level


  106mmol/L


()


 


Carbon Dioxide Level


  22mmol/L


(21-31)


 


Anion Gap 13 (8-16)  


 


Blood Urea Nitrogen 7mg/dl (7-20)  


 


Creatinine


  0.50mg/dl


(0.44-1.00)


 


Glucose Level


  89mg/dl


()


 


Calcium Level


  9.2mg/dl


(8.4-10.2)


 


Total Bilirubin


  0.3mg/dl


(0.2-1.3)


 


Direct Bilirubin


  0.00mg/dl


(0.00-0.20)


 


Indirect Bilirubin


  0.3mg/dl


(0-1.1)


 


Aspartate Amino Transf


(AST/SGOT) 86IU/L (15-46)


H


 


Alanine Aminotransferase


(ALT/SGPT) 50IU/L (13-69)


 


 


Alkaline Phosphatase


  114IU/L


()


 


Total Protein


  6.8g/dl


(6.1-8.1)


 


Albumin


  3.6g/dl


(3.3-4.9)


 


Globulin


  3.20g/dl


(1.3-3.2)


 


Albumin/Globulin Ratio 1.12  











OB  Assessment/Plan


Other Assessment:


IUP at 30 weeks and 2 days


Right and mid abdominal pain, leukocytosis, biliary sludge noted in the 

ultrasound


No pericholecystic fluid, no thickening of the gallbladder wall.  No evidence 

of cholecystitis


Slight elevated LFTs, unclear etiology


No evidence of pyelonephritis


Due to location of the pain as well as displacement of the appendix during 

pregnancy recommended and discussed with patient to have a surgery consultation 

to rule out for appendicitis


I have consulted with general surgery  who kindly agreed to see the 

patient today


Patient also with elevated hCG and AFP during pregnancy at risk of IUGR and 

preeclampsia.  Had ultrasound with [facility] perinatology which recommended to repeat 

her ultrasound every 4 weeks.


Growth ultrasound at this facility was done, concern for IUGR based on EFW 

percentile


I have discussed this with radiology who confirmed the estimated fetal weight 

by ultrasound at this facility 3.7 percentile this possibly not accurate since 

each individual measurements including BPD, HC and AC are within the range of 

gestational age


Clinical RONEL was inaccurately placed in the system.  I discussed this with Dr. Moody team who advised me to inform radiology about accurate clinical dating.  

Recommended by radiology to repeat the test.  Repeat ultrasound showed 

appropriate growth.  Per Dr. Moody's recommendation if the growth is normal only 

needs to have a follow-up after discharge from the hospital with high-risk 

pregnancy.  No requirement for Doppler at this time unless any concern for IUGR


Patient needs to have a follow-up as outpatient after discharge from the 

hospital for growth ultrasound with [facility] perinatology


Awaiting general surgery consultation for the recommendation


We will continue to monitor the patient in-house


Expectant management











WILMER BACA MD [place] 15, 2017 00:30

## 2017-06-15 NOTE — QN
Documentation


Comment


Patient at 30+weeks of gestation with right upper quadrant pain


Patient stable and afebrile


Leukocytosis improving


WBC within normal limits


Continue with present management


Consider DC home tomorrow if cleared by hospitalist











NIK ACEVEDO MD [place] 15, 2017 19:05

## 2017-06-15 NOTE — EN
Date/Time of Note


Date/Time of Note


DATE: 6/15/17 


TIME: 19:29





Event Note Medicine


Medicine Event Note


Brief hospitalist consult service follow up note


pt not physically seen today but notes and labs reviewed


WBCs improving, abd pain improving per notes


defer decision re imaging to primary team


I will personally assess pt tomorrow


Neil Felipe MD


hospitalist











NEIL FELIPE MD [place] 15, 2017 19:30

## 2017-06-15 NOTE — PN
Date/Time of Note


Date/Time of Note


DATE: 6/15/17 


TIME: 17:05





Assessment/Plan


Lines/Catheters


IV Catheter Type (from [facility]):  Peripheral IV





Assessment/Plan


Assessment/Plan


31-year-old female who is 30 weeks pregnant with right-sided abdominal pain.


* Remains afebrile.  Leukocytosis improving.  


* Given the duration of the patient's symptoms and clinical findings, my 

suspicion for acute appendicitis remains low at this time.


* I do not believe surgical intervention is required at this time.  


* If the patient's symptoms do not improve, then MRI versus a CT scan should be 

considered for further evaluation.  Both of these have been proven to be safe 

in pregnancy during all trimesters through multiple studies.


* Continue management per OB/GYN


 


The above was discussed with the patient and the nurse at the bedside.  I 

ensured that all the patient's questions were answered.  Further 

recommendations will be made based on the patient's clinical course.





Subjective


24 Hr Interval Summary


Still with intermittent right-sided abdominal pain that is controlled with 

Percocet.  Tolerating diet and denies nausea.  Afebrile.





Exam/Review of Systems


Vital Signs


Vitals











 Intake and Output   


 


 6/14/17 6/14/17 6/15/17





 15:00 23:00 07:00


 


Intake Total 625 ml  


 


Output Total  500 ml 


 


Balance 625 ml -500 ml 











Exam


Free Text/Dictation


GENERAL APPEARANCE:  She is a well-developed, well-nourished, obese  

female who is awake, alert, and oriented x3 and in no acute distress at this 

time.  She is observed ambulating from the bathroom to her bed without 

difficulty.


CARDIOVASCULAR:  S1, S2.  Regular rate and rhythm.  No murmurs appreciated.


RESPIRATORY:  Clear to auscultation bilaterally.


ABDOMEN:  Soft, gravid.  Bowel sounds are present. Right lower quadrant and 

right upper quadrant tenderness to palpation, which is mildly improved on 

examination from yesterday.  There is no involuntary guarding or evidence of 

diffuse peritonitis.


EXTREMITIES:  Do not exhibit any signs of cyanosis, edema, or clubbing.





Results


Result Diagram:  


6/15/17 0527                                                                   

             6/15/17 0527














JENNYFER NOWAK MD [place] 15, 2017 17:10

## 2017-06-16 LAB
ADD SCAN DIFF: NO
ERYTHROCYTE [DISTWIDTH] IN BLOOD BY AUTOMATED COUNT: 14.4 % (ref 11.5–14.5)
HCT VFR BLD CALC: 37.7 % (ref 37–47)
HGB BLD-MCNC: 13.2 G/DL (ref 12–16)
LYMPHOCYTES # BLD AUTO: 1.2 10^3/UL (ref 0.8–2.9)
LYMPHOCYTES NFR BLD AUTO: 8 % (ref 15–51)
MCH RBC QN AUTO: 30.8 PG (ref 29–33)
MCHC RBC AUTO-ENTMCNC: 35 G/DL (ref 32–37)
MCV RBC AUTO: 87.9 FL (ref 82–101)
MONOCYTES # BLD: 0.9 10^3/UL (ref 0.3–0.9)
MONOCYTES NFR BLD: 6 % (ref 0–11)
NEUTROPHILS # BLD: 12.4 10^3/UL (ref 1.6–7.5)
NEUTROPHILS NFR BLD AUTO: 82 % (ref 39–77)
PLATELET # BLD: 130 10^3/UL (ref 140–415)
PMV BLD AUTO: 11.6 FL (ref 7.4–10.4)
RBC # BLD AUTO: 4.29 10^6/UL (ref 4.2–5.4)
TOTAL CELLS COUNTED PERCENT: 100 %
WBC # BLD AUTO: 15.1 10^3/UL (ref 4.8–10.8)

## 2017-06-16 RX ADMIN — Medication SCH TAB: at 09:08

## 2017-06-16 RX ADMIN — DOCUSATE SODIUM SCH MG: 100 CAPSULE, LIQUID FILLED ORAL at 09:08

## 2017-06-16 NOTE — CONS
Date/Time of Note


Date/Time of Note


DATE: 6/16/17 


TIME: 17:20





Consultation Date/Type/Reason


Admit Date/Time


Jun 12, 2017 at 07:02


Type of Consultation:  hospitalist





24 HR Interval Summary


Free Text/Dictation


Pt reports abd pain improving


vitals reviewed, no fevers


nad


no mrg


lung clear


abd gravid


no le edema





WBCs slightly higher


repeat UA negative





30 yo F at 32 weeks of pregnancy presented 6.12 with c/o several days of R 

sided abd pain. Pt reports pain began at 2am 6.10. Pain mostly in R lower 

quadrant also radiates to low back at times. No fevers or chills. No nausea or 

vomiting. Had some constipation but this has since resolved. Also states that 

during episodes of particularly intense pain that her hands swell as well. No 

new food exposures recently. No dysuria or hematuria. 


etiology of pain unclear. consider 2/2 her small kidney stone seen on prior 

imaging v previously passed gallstone based on US finding of sludge.





pain now improving.


gen surg following as well


discharge as per laborist





Exam/Review of Systems


Vital Signs


Vitals











 Intake and Output   


 


 6/15/17 6/15/17 6/16/17





 15:00 23:00 07:00


 


Output Total   1350 ml


 


Balance   -1350 ml











Results


Result Diagram:  


6/16/17 0600                                                                   

             6/15/17 0527





Results 24 hrs





Laboratory Tests








Test


  6/16/17


06:00


 


White Blood Count 15.1  #H


 


Red Blood Count 4.29  


 


Hemoglobin 13.2  


 


Hematocrit 37.7  


 


Mean Corpuscular Volume 87.9  


 


Mean Corpuscular Hemoglobin 30.8  


 


Mean Corpuscular Hemoglobin


Concent 35.0  


 


 


Red Cell Distribution Width 14.4  


 


Platelet Count 130  L


 


Mean Platelet Volume 11.6  H


 


Neutrophils % 82.0  H


 


Lymphocytes % 8.0  L


 


Reactive Lymphocytes % 4.0  


 


Monocytes % 6.0  


 


Neutrophils # 12.4  H


 


Lymphocytes # 1.2  


 


Monocytes # 0.9  











Medications


Medications





 Current Medications


Prenat Multivit/ [place]/Iron/Folic Ac (Prenatal S) 1 tab DAILY PO  Last 

administered on 6/16/17at 09:08; Admin Dose 1 TAB;  Start 6/12/17 at 09:00


Acetaminophen (Tylenol Tab) 650 mg Q4H  PRN PO PAIN AND OR ELEVATED TEMP Last 

administered on 6/16/17at 12:37; Admin Dose 650 MG;  Start 6/12/17 at 07:00


Al Hydrox/Mg Hydrox/Simethicone (Mag-Al Plus) 30 ml Q4H  PRN PO 

GASTROINTESTINAL UPSET Last administered on 6/14/17at 16:05; Admin Dose 30 ML;  

Start 6/13/17 at 20:00


Oxycodone/ Acetaminophen (Percocet (5/ 325)) 1 tab Q4H  PRN PO PAIN Last 

administered on 6/15/17at 20:22; Admin Dose 1 TAB;  Start 6/14/17 at 21:00


Hydrocortisone (Hydrocortisone 1% Cr) 1 applic Q6  PRN TOP ITCHING Last 

administered on 6/14/17at 21:55; Admin Dose 1 APPLIC;  Start 6/14/17 at 21:30


Docusate Sodium (Colace) 100 mg BID PO  Last administered on 6/16/17at 09:08; 

Admin Dose 100 MG;  Start 6/15/17 at 21:00


Nitrofurantoin Macrocrystals (Macrobid) 100 mg BID PO  Last administered on 6/16 /17at 09:07; Admin Dose 100 MG;  Start 6/15/17 at 21:00











NEIL HERNANDEZ MD Jun 16, 2017 17:21

## 2017-06-17 NOTE — DS
DATE OF ADMISSION: 2017

DATE OF DISCHARGE: 2017

 

DISCHARGE DIAGNOSIS:  

1.  Intrauterine pregnancy at 30 weeks.

1.  Biliary colic.

2.  Probable right kidney stone. 

 

HOSPITAL COURSE:  The patient is a 22-year-old female,  1, G1, P0, who presents at 30 weeks c
omplaining of right-sided abdominal pain in upper and lower quadrant, for the last 5 days.  The adelfo
ent upon admission had some elevated WBCs. The patient was admitted to rule out appendicitis and fur
ther evaluation of right lower quadrant pain.  The patient had imaging studies which showed some adore
iary sludge and also patient had a small 8 mm right renal stone which possibly could represent on ul
trasound presented a linear focus which could represent nonobstructing stone versus just possible va
scular calcification. The patient, on urinalysis, had no blood and the final urine culture is negati
ve.  On urine specimen again, the patient had no blood.  The patient was admitted, however, to rule 
out appendicitis and another etiologies. The patient had a surgery and medicine consultation.  On  day of discharge, the patient was doing well and is discharged on  2017.  H and H is stable.
  Urine culture is negative.  Chemistry panel also within normal limits.  Patient was feeling well. 
 Minimal pain. This is a category 1 tracing.  The patient will be discharged home in stable conditio
n.  ER precautions were given.  The patient to follow with OB/GYN doctor on Monday and patient is se
nt home in stable condition.

 

 

Dictated By: KAR MEJIA MD

 

AS/NTS

DD:    2017 17:30:44

DT:    2017 07:12:45

Conf#: [id number]

DID#:  [id number]

## 2017-06-29 ENCOUNTER — HOSPITAL ENCOUNTER (INPATIENT)
Dept: HOSPITAL 10 - OBT | Age: 32
LOS: 4 days | Discharge: HOME | DRG: 781 | End: 2017-07-03
Attending: OBSTETRICS & GYNECOLOGY | Admitting: OBSTETRICS & GYNECOLOGY
Payer: COMMERCIAL

## 2017-06-29 VITALS — SYSTOLIC BLOOD PRESSURE: 128 MMHG | DIASTOLIC BLOOD PRESSURE: 61 MMHG | HEART RATE: 61 BPM

## 2017-06-29 DIAGNOSIS — N13.6: ICD-10-CM

## 2017-06-29 DIAGNOSIS — O23.03: ICD-10-CM

## 2017-06-29 DIAGNOSIS — O23.43: Primary | ICD-10-CM

## 2017-06-29 DIAGNOSIS — Z3A.32: ICD-10-CM

## 2017-06-29 LAB
ADD UMIC: YES
BACTERIA #/AREA URNS HPF: (no result) /HPF
COLOR UR: YELLOW
GLUCOSE UR STRIP-MCNC: NEGATIVE MG/DL
KETONES UR STRIP.AUTO-MCNC: NEGATIVE MG/DL
MUCOUS THREADS #/AREA URNS HPF: (no result) /HPF
NITRITE UR QL STRIP.AUTO: NEGATIVE MG/DL
RBC # UR AUTO: (no result) MG/DL
SQUAMOUS #/AREA URNS HPF: (no result) /HPF
UR ASCORBIC ACID: NEGATIVE MG/DL
UR BILIRUBIN (DIP): NEGATIVE MG/DL
UR CLARITY: (no result)
UR PH (DIP): 5 (ref 5–9)
UR RBC: 4 /HPF (ref 0–5)
UR SPECIFIC GRAVITY (DIP): 1.02 (ref 1–1.03)
UR TOTAL PROTEIN (DIP): (no result) MG/DL
UROBILINOGEN UR STRIP-ACNC: NEGATIVE MG/DL
WBC # UR STRIP: (no result) LEU/UL

## 2017-06-29 PROCEDURE — 76817 TRANSVAGINAL US OBSTETRIC: CPT

## 2017-06-29 PROCEDURE — 76775 US EXAM ABDO BACK WALL LIM: CPT

## 2017-06-29 PROCEDURE — 87086 URINE CULTURE/COLONY COUNT: CPT

## 2017-06-29 PROCEDURE — 96361 HYDRATE IV INFUSION ADD-ON: CPT

## 2017-06-29 PROCEDURE — 85025 COMPLETE CBC W/AUTO DIFF WBC: CPT

## 2017-06-29 PROCEDURE — 81001 URINALYSIS AUTO W/SCOPE: CPT

## 2017-06-29 PROCEDURE — G0463 HOSPITAL OUTPT CLINIC VISIT: HCPCS

## 2017-06-29 PROCEDURE — 76818 FETAL BIOPHYS PROFILE W/NST: CPT

## 2017-06-29 PROCEDURE — 74181 MRI ABDOMEN W/O CONTRAST: CPT

## 2017-06-29 PROCEDURE — 36415 COLL VENOUS BLD VENIPUNCTURE: CPT

## 2017-06-29 PROCEDURE — 96360 HYDRATION IV INFUSION INIT: CPT

## 2017-06-29 RX ADMIN — BETAMETHASONE SODIUM PHOSPHATE AND BETAMETHASONE ACETATE SCH MG: 3; 3 INJECTION, SUSPENSION INTRA-ARTICULAR; INTRALESIONAL; INTRAMUSCULAR at 23:43

## 2017-06-29 RX ADMIN — PYRIDOXINE HYDROCHLORIDE SCH MLS/HR: 100 INJECTION, SOLUTION INTRAMUSCULAR; INTRAVENOUS at 23:17

## 2017-06-29 RX ADMIN — PYRIDOXINE HYDROCHLORIDE SCH MLS/HR: 100 INJECTION, SOLUTION INTRAMUSCULAR; INTRAVENOUS at 23:51

## 2017-06-29 RX ADMIN — PYRIDOXINE HYDROCHLORIDE SCH MLS/HR: 100 INJECTION, SOLUTION INTRAMUSCULAR; INTRAVENOUS at 18:05

## 2017-06-29 RX ADMIN — PYRIDOXINE HYDROCHLORIDE SCH MLS/HR: 100 INJECTION, SOLUTION INTRAMUSCULAR; INTRAVENOUS at 21:05

## 2017-06-29 RX ADMIN — CEPHALEXIN SCH MG: 500 CAPSULE ORAL at 23:43

## 2017-06-29 NOTE — RADRPT
PROCEDURE:   US Renal 

 

CLINICAL INDICATION:   Pain 

 

TECHNIQUE:   Multiple sonographic images of the kidneys and bladder were obtained.  Evaluation of th
e kidneys and bladder was performed as well with gray scale and color and Doppler evaluation using a
 curved array transducer.  The images were reviewed on a high-resolution PACS workstation. 

 

COMPARISON:   06/12/2017 

 

FINDINGS:

 

The right kidney measures 10.02 cm. The left kidney measures 11.3  cm.  Renal cortical echogenicity 
is within normal limits. There is minimal right pelvocaliectasis.  There is no left hydronephrosis. 
 Kidneys are otherwise normal appearance.  No perinephric fluid collection is seen. The bladder is p
oorly characterized..    

 

IMPRESSION:

 

Minimal right pelvocaliectasis.  Otherwise negative.  Urinary bladder not well characterized.

 

RPTAT:  HMVK

_____________________________________________ 

.Anthony López MD, MD           Date    Time 

Electronically viewed and signed by .Anthony López MD, MD on 06/29/2017 22:56 

 

D:  06/29/2017 22:56  T:  06/29/2017 22:56

.K/

## 2017-06-29 NOTE — HP
Date/Time of Note


Date/Time of Note


DATE: 17 


TIME: 21:49





OB - History


Hx of Present Pregnancy


Free Text/Dictation


32 yo P0 @ 32.3 wks, presents with severe back pain and pre-term ctx


Good FM, no VB, no LOF, + ctx


She was admitted several weeks ago for the same pain, which was attributed to 

an 8mm renal stone


Chief Complaint:  back pain and ctx


:  1


Para:  0


Prenatal Care:  Good Care


Ultrasounds:  Normal mid trimester US


Other Pregnancy Concerns:


renal calculus





Past Family/Social History


*


Past Medical, Surgical, Family and Obstetric Histories reviewed from prenatal 

chart.





OB  Admission Exam


Vital Signs


Vital Signs





 Vital Signs








  Date Time  Temp Pulse Resp B/P Pulse Ox O2 Delivery O2 Flow Rate FiO2


 


17 17:16 97.7 61  128/61    











Physical Exam


Abdomen:  WNL


Extremities:  Normal


Fetal Heart Rate:  140's


Accelerations:  Accelerations Present


Decelerations:  No Decelerations (1)


Varibility:  Moderate


Contractions on Admission:  6-10 Minutes Apart


Intensity:  Mild





OB  Assessment/Plan


Other Assessment:


32 yo P0 @ 32.3 wks w pre-term ctx


- possible renal calculus; will repeat renal ultrasound


- possible UTI; u/a shows WBC and blood; will start Keflex


- possible PTL- cervical length was 3 and cervix appears closed on sono; nml AMITA

, BPP 8/8; will give betamethasone to promote FLM; if patient continues 

estefania, will start magnesium sulfate











SINA MATT MD 2017 22:02

## 2017-06-29 NOTE — RADRPT
PROCEDURE:   US biophysical profile.  

 

CLINICAL INDICATION:   Severe pelvic pain.

 

TECHNIQUE:   Multiple sonographic images of the pregnant uterus were obtained.  The images were revi
ewed on a PACS workstation. 

 

COMPARISON:   06/04/2017. 

 

FINDINGS:

There is a single live intrauterine gestation.  Fetal heart rate is 134 beats per minute. 

The position is cephalic.

The placenta is fundal grade 1 with no abruption or previa. 

The AMITA is 13.4 cm. (Normal = 5-20 cm.)

 

Fetal Breathing Movement: 2

Gross Body Movement: 2

Fetal Tone: 2

Qualitative Amniotic Fluid Volume: 2

TOTAL: 8

 

IMPRESSION:

1.  The biophysical score is 8/8. 

 

RPTAT: QQ

_____________________________________________ 

.Lei Jurado MD, MD           Date    Time 

Electronically viewed and signed by .Lei Jurado MD, MD on 06/29/2017 17:59 

 

D:  06/29/2017 17:59  T:  06/29/2017 17:59

.R/

## 2017-06-29 NOTE — RADRPT
PROCEDURE:   CERVICAL LENGTH ULTRASOUND  

 

CLINICAL INDICATION:    labor at 34 weeks gestational age. 

 

TECHNIQUE:   Trans-vaginal imaging of the cervical canal was performed utilizing gray-scale imaging.
  Sagittal and transverse images were obtained.  Trans-abdominal images were also obtained.  The allison
ges were reviewed on a PACS workstation.  

 

COMPARISON:   None. 

 

FINDINGS:

There is a single live intrauterine pregnancy.  

Fetal heart rate is 134 beats per minute.  

Position is cephalic and placenta is fundal grade 1. There is no placenta previa.  

The cervix is closed with a length of 3.0 cm.  

 

IMPRESSION:

1.  Cervical length is 3.0 cm.

 

RPTAT: QQ

_____________________________________________ 

.Lei Jurado MD, MD           Date    Time 

Electronically viewed and signed by .Lei Jurado MD, MD on 2017 19:10 

 

D:  2017 19:10  T:  2017 19:10

.R/

## 2017-06-30 LAB
ADD SCAN DIFF: NO
BASOPHILS # BLD AUTO: 0 10^3/UL (ref 0–0.1)
BASOPHILS NFR BLD: 0.2 % (ref 0–2)
EOSINOPHIL # BLD: 0.1 10^3/UL (ref 0–0.5)
EOSINOPHIL NFR BLD: 0.9 % (ref 0–7)
ERYTHROCYTE [DISTWIDTH] IN BLOOD BY AUTOMATED COUNT: 14.5 % (ref 11.5–14.5)
HCT VFR BLD CALC: 34 % (ref 37–47)
HGB BLD-MCNC: 12.3 G/DL (ref 12–16)
LYMPHOCYTES # BLD AUTO: 3.5 10^3/UL (ref 0.8–2.9)
LYMPHOCYTES NFR BLD AUTO: 29.7 % (ref 15–51)
MCH RBC QN AUTO: 31.9 PG (ref 29–33)
MCHC RBC AUTO-ENTMCNC: 36.2 G/DL (ref 32–37)
MCV RBC AUTO: 88.1 FL (ref 82–101)
MONOCYTES # BLD: 0.8 10^3/UL (ref 0.3–0.9)
MONOCYTES NFR BLD: 6.7 % (ref 0–11)
NEUTROPHILS # BLD: 7.4 10^3/UL (ref 1.6–7.5)
NEUTROPHILS NFR BLD AUTO: 62 % (ref 39–77)
NRBC # BLD MANUAL: 0 10^3/UL (ref 0–0)
NRBC BLD QL: 0 /100WBC (ref 0–0)
PLATELET # BLD: 254 10^3/UL (ref 140–415)
PMV BLD AUTO: 11.5 FL (ref 7.4–10.4)
RBC # BLD AUTO: 3.86 10^6/UL (ref 4.2–5.4)
WBC # BLD AUTO: 11.9 10^3/UL (ref 4.8–10.8)

## 2017-06-30 RX ADMIN — PYRIDOXINE HYDROCHLORIDE SCH MLS/HR: 100 INJECTION, SOLUTION INTRAMUSCULAR; INTRAVENOUS at 10:23

## 2017-06-30 RX ADMIN — PYRIDOXINE HYDROCHLORIDE SCH MLS/HR: 100 INJECTION, SOLUTION INTRAMUSCULAR; INTRAVENOUS at 23:17

## 2017-06-30 RX ADMIN — CEPHALEXIN SCH MG: 500 CAPSULE ORAL at 23:49

## 2017-06-30 RX ADMIN — BETAMETHASONE SODIUM PHOSPHATE AND BETAMETHASONE ACETATE SCH MG: 3; 3 INJECTION, SUSPENSION INTRA-ARTICULAR; INTRALESIONAL; INTRAMUSCULAR at 23:13

## 2017-06-30 RX ADMIN — CEPHALEXIN SCH MG: 500 CAPSULE ORAL at 18:59

## 2017-06-30 RX ADMIN — PYRIDOXINE HYDROCHLORIDE SCH MLS/HR: 100 INJECTION, SOLUTION INTRAMUSCULAR; INTRAVENOUS at 21:32

## 2017-06-30 RX ADMIN — PYRIDOXINE HYDROCHLORIDE SCH MLS/HR: 100 INJECTION, SOLUTION INTRAMUSCULAR; INTRAVENOUS at 19:20

## 2017-06-30 RX ADMIN — Medication SCH TAB: at 09:11

## 2017-06-30 RX ADMIN — PYRIDOXINE HYDROCHLORIDE SCH MLS/HR: 100 INJECTION, SOLUTION INTRAMUSCULAR; INTRAVENOUS at 22:27

## 2017-06-30 RX ADMIN — CEPHALEXIN SCH MG: 500 CAPSULE ORAL at 13:06

## 2017-06-30 RX ADMIN — CEPHALEXIN SCH MG: 500 CAPSULE ORAL at 09:11

## 2017-06-30 RX ADMIN — PYRIDOXINE HYDROCHLORIDE SCH MLS/HR: 100 INJECTION, SOLUTION INTRAMUSCULAR; INTRAVENOUS at 03:46

## 2017-06-30 NOTE — PN
Date/Time of Note


Date/Time of Note


DATE: 17 


TIME: 11:34





OB Subjective


Subjective


Subjective





OB high risk visit.





This patient is 31 yol  about 32 and 4/7 week pregnant 


Came in yesterday C/O lower abdominal and back pain


On exam abdomen is soft, slight tenderness of lower abdomen  and suprapubic 

area. 


No contraction , FHT is normal


on pelvic exam; cervix is closed , no bleeding, no real tenderness.


lung are clear 


She in now placed on [id number] mg BID,. Will change it to Q 6 hour pending the 

result of urine culture 


On ultrasound study there is a report of R Pelvocaliectasis,  


otherwise normal findings


In fact no significant finding explaining of this patent's complaint








 Laboratory Tests








Test


  17


17:15 17


23:50


 


Urine Color YELLOW  


 


Urine Clarity


  SLIGHTLY


CLOUDY 


 


 


Urine pH 5.0  


 


Urine Specific Gravity 1.023  


 


Urine Ketones NEGATIVEmg/dL  


 


Urine Nitrite NEGATIVEmg/dL  


 


Urine Bilirubin NEGATIVEmg/dL  


 


Urine Urobilinogen NEGATIVEmg/dL  


 


Urine Leukocyte Esterase 3+Wilian/ul  


 


Urine Microscopic RBC 4/HPF  


 


Urine Microscopic WBC 4/HPF  


 


Urine Squamous Epithelial


Cells FEW/HPF 


  


 


 


Urine Bacteria FEW/HPF  


 


Urine Mucus FEW/HPF  


 


Urine Hemoglobin 1+mg/dL  


 


Urine Glucose NEGATIVEmg/dL  


 


Urine Total Protein 1+mg/dl  


 


White Blood Count  11.910^3/ul 


 


Red Blood Count  3.8610^6/ul 


 


Hemoglobin  12.3g/dl 


 


Hematocrit  34.0% 


 


Mean Corpuscular Volume  88.1fl 


 


Mean Corpuscular Hemoglobin  31.9pg 


 


Mean Corpuscular Hemoglobin


Concent 


  36.2g/dl 


 


 


Red Cell Distribution Width  14.5% 


 


Platelet Count  10570^3/UL 


 


Mean Platelet Volume  11.5fl 


 


Neutrophils %  62.0% 


 


Lymphocytes %  29.7% 


 


Monocytes %  6.7% 


 


Eosinophils %  0.9% 


 


Basophils %  0.2% 


 


Nucleated Red Blood Cells %  0.0/100WBC 


 


Neutrophils #  7.410^3/ul 


 


Lymphocytes #  3.510^3/ul 


 


Monocytes #  0.810^3/ul 


 


Eosinophils #  0.110^3/ul 


 


Basophils #  0.010^3/ul 


 


Nucleated Red Blood Cells #  0.010^3/ul 








 Current Medications








 Medications


  (Trade)  Dose


 Ordered  Sig/Ji


 Route


 PRN Reason  Start Time


 Stop Time Status Last Admin


Dose Admin


 


 Lactated Ringer's  1,000 ml @ 


 125 mls/hr  Q8H


 IV


   17 18:00


 17 21:55 DC 17 21:05


 


 


 Lactated Ringer's


  (Lr)  1,000 ml @ 


 125 mls/hr  Q8H


 IV


   17 21:32


    17 03:46


 


 


 Betamethasone


 Acet/Betameth


 SodPhos


  ([place]


 Soluspan)  12 mg  Q24H


 IM


   17 22:00


 17 22:01  17 23:43


 


 


 Prenat Multivit/


 [place]/Iron/Folic


 Ac


  (Prenatal S)  1 tab  DAILY


 PO


   17 09:00


    17 09:11


 


 


 Docusate Sodium


  (Colace)  100 mg  DAILY  PRN


 PO


 CONSTIPATION  17 22:00


     


 


 


 Cephalexin


  (Keflex)  500 mg  BID


 PO


   17 22:00


    17 09:11


 


 


 Morphine Sulfate


 3 mg  3 mg  Q6H  PRN


 IV


 PAIN  17 22:00


    17 05:25


 


 


 Lactated Ringer's


  (Lr)  1,000 ml @ 


 125 mls/hr  Q8H


 IV


   17 23:17


    17 10:23


 


 


 Acetaminophen


  (Tylenol Tab)  650 mg  Q6H  PRN


 PO


 PAIN AND OR ELEVATED TEMP  17 11:00


    17 11:24


 








Plan : Will continue higher (normal) dose of Keflex and waiting the result of 

Urine culture .











HIPOLITO COPPOLA MD 2017 11:49

## 2017-07-01 RX ADMIN — CEPHALEXIN SCH MG: 500 CAPSULE ORAL at 11:38

## 2017-07-01 RX ADMIN — PYRIDOXINE HYDROCHLORIDE SCH MLS/HR: 100 INJECTION, SOLUTION INTRAMUSCULAR; INTRAVENOUS at 06:19

## 2017-07-01 RX ADMIN — SENNOSIDES PRN TAB: 8.6 TABLET, FILM COATED ORAL at 20:59

## 2017-07-01 RX ADMIN — CEPHALEXIN SCH MG: 500 CAPSULE ORAL at 18:54

## 2017-07-01 RX ADMIN — SENNOSIDES PRN TAB: 8.6 TABLET, FILM COATED ORAL at 09:52

## 2017-07-01 RX ADMIN — CEPHALEXIN SCH MG: 500 CAPSULE ORAL at 06:01

## 2017-07-01 RX ADMIN — Medication SCH TAB: at 09:52

## 2017-07-01 RX ADMIN — PYRIDOXINE HYDROCHLORIDE SCH MLS/HR: 100 INJECTION, SOLUTION INTRAMUSCULAR; INTRAVENOUS at 07:17

## 2017-07-01 NOTE — RADRPT
PROCEDURE:   

MRI Abdomen without contrast. 

 

CLINICAL INDICATION:   

Abdominal pain.  There is a question of appendicitis.  The patient is 34 weeks pregnant.

 

TECHNIQUE:   

Multiplanar and multisequence MRI of the abdomen was performed without contrast.

 

COMPARISON:   

Ultrasounds dated 06/29/2017.  

 

FINDINGS:

 

There is a single intrauterine pregnancy with a cephalic lie and a left lateral placenta.  The ovari
es and adnexa are unremarkable bilaterally.

 

The appendix is not clearly identified but there is no inflammatory change or fluid in the right low
er quadrant or right mid abdomen adjacent to the cecum.  The liver, gallbladder, spleen, pancreas, a
nd adrenal glands are unremarkable.  There is no intra or extrahepatic biliary ductal dilatation.  T
here is mild right hydroureteronephrosis, likely physiologic.  The visualized bowel demonstrates no 
wall thickening or evidence of obstruction.

 

IMPRESSION:

1.  Nonvisualization of the appendix, but without secondary findings to suggest acute appendicitis.

2.  Single intrauterine pregnancy.

3.  Mild right hydroureteronephrosis, unchanged when compared the recent abdominal ultrasound, given
 the difference in technique. 

 

RPTAT: EE

_____________________________________________ 

.Jose Rutherford MD, MD           Date    Time 

Electronically viewed and signed by .Jose Rutherford MD, MD on 07/01/2017 17:19 

 

D:  07/01/2017 17:19  T:  07/01/2017 17:19

.P/

## 2017-07-01 NOTE — QN
Documentation


Comment


30 yo P0 @ 32+wks, presents with lower abdominal pain +FM No VB No LOF No CTXs


No CVA tenderness


NST reassuring


Pantops No CTXs


Pelvic Deferred


Abd Supra pubic tenderness


--->Perinatalogy consult


--->Hospitalist Consult


--->Abdominal ultrasound











YANET MCCABE M.D. Jul 1, 2017 10:01

## 2017-07-02 RX ADMIN — CEPHALEXIN SCH MG: 500 CAPSULE ORAL at 11:50

## 2017-07-02 RX ADMIN — CEPHALEXIN SCH MG: 500 CAPSULE ORAL at 06:04

## 2017-07-02 RX ADMIN — Medication SCH TAB: at 09:15

## 2017-07-02 RX ADMIN — CEPHALEXIN SCH MG: 500 CAPSULE ORAL at 00:30

## 2017-07-02 RX ADMIN — CEPHALEXIN SCH MG: 500 CAPSULE ORAL at 20:04

## 2017-07-02 NOTE — QN
Documentation


Comment


admitted for abdominal pain. history is conflicting. 


Subjective: 


c/o diffuse abdominal pain, denies vaginal bleeding, LOF per vagina, UCs, N/V/F/

C. reports tolerating regular diet with normal appetite. she reports last BM 

and last flatus was 3 days ago. she has been getting Narcotics, but etiology of 

pain is not clear. had MRI, I reviewed the MRI 





Objective:


Afebrile, VSS


NAD


A&O


Abdomen: soft, trace tender, gravid


Extremities: mild edema bilaterally





Assesment: 


IUP 32 weeks


Abdominal pain. ? etiology. MRI only showed mild R. Hydro


she is on Abx Keflex


constipation


plan:


continue observation


stop Narcotics


Tylenol prn


laxatives


plan: current care











RAMA PAEZ MD Jul 2, 2017 11:29

## 2017-07-03 ENCOUNTER — HOSPITAL ENCOUNTER (INPATIENT)
Dept: HOSPITAL 10 - OBT | Age: 32
LOS: 6 days | Discharge: HOME | End: 2017-07-09
Attending: OBSTETRICS & GYNECOLOGY | Admitting: OBSTETRICS & GYNECOLOGY
Payer: COMMERCIAL

## 2017-07-03 VITALS
BODY MASS INDEX: 36.92 KG/M2 | WEIGHT: 195.55 LBS | HEIGHT: 61 IN | BODY MASS INDEX: 36.92 KG/M2 | HEIGHT: 61 IN | WEIGHT: 195.55 LBS

## 2017-07-03 DIAGNOSIS — K82.8: ICD-10-CM

## 2017-07-03 DIAGNOSIS — R74.0: ICD-10-CM

## 2017-07-03 DIAGNOSIS — Z3A.33: ICD-10-CM

## 2017-07-03 LAB
ADD SCAN DIFF: NO
ALBUMIN SERPL-MCNC: 3.7 G/DL (ref 3.3–4.9)
ALBUMIN/GLOB SERPL: 1.19 {RATIO}
ALP SERPL-CCNC: 150 IU/L (ref 42–121)
ALT SERPL-CCNC: 156 IU/L (ref 13–69)
AMYLASE SERPL-CCNC: 120 U/L (ref 11–123)
ANION GAP SERPL CALC-SCNC: 18 MMOL/L (ref 8–16)
AST SERPL-CCNC: 139 IU/L (ref 15–46)
BASOPHILS # BLD AUTO: 0 10^3/UL (ref 0–0.1)
BASOPHILS NFR BLD: 0.2 % (ref 0–2)
BILIRUB DIRECT SERPL-MCNC: 0 MG/DL (ref 0–0.2)
BILIRUB SERPL-MCNC: 0.1 MG/DL (ref 0.2–1.3)
BUN SERPL-MCNC: 11 MG/DL (ref 7–20)
CALCIUM SERPL-MCNC: 9.3 MG/DL (ref 8.4–10.2)
CHLORIDE SERPL-SCNC: 98 MMOL/L (ref 97–110)
CO2 SERPL-SCNC: 25 MMOL/L (ref 21–31)
CREAT SERPL-MCNC: 0.57 MG/DL (ref 0.44–1)
EOSINOPHIL # BLD: 0 10^3/UL (ref 0–0.5)
EOSINOPHIL NFR BLD: 0.2 % (ref 0–7)
ERYTHROCYTE [DISTWIDTH] IN BLOOD BY AUTOMATED COUNT: 14.7 % (ref 11.5–14.5)
GLOBULIN SER-MCNC: 3.1 G/DL (ref 1.3–3.2)
GLUCOSE SERPL-MCNC: 85 MG/DL (ref 70–220)
HCT VFR BLD CALC: 37.8 % (ref 37–47)
HGB BLD-MCNC: 13.1 G/DL (ref 12–16)
LYMPHOCYTES # BLD AUTO: 2.8 10^3/UL (ref 0.8–2.9)
LYMPHOCYTES NFR BLD AUTO: 19.9 % (ref 15–51)
MCH RBC QN AUTO: 31 PG (ref 29–33)
MCHC RBC AUTO-ENTMCNC: 34.7 G/DL (ref 32–37)
MCV RBC AUTO: 89.4 FL (ref 82–101)
MONOCYTES # BLD: 1.1 10^3/UL (ref 0.3–0.9)
MONOCYTES NFR BLD: 7.7 % (ref 0–11)
NEUTROPHILS # BLD: 10.1 10^3/UL (ref 1.6–7.5)
NEUTROPHILS NFR BLD AUTO: 70.9 % (ref 39–77)
NRBC # BLD MANUAL: 0 10^3/UL (ref 0–0)
NRBC BLD QL: 0.3 /100WBC (ref 0–0)
PLATELET # BLD: 275 10^3/UL (ref 140–415)
PMV BLD AUTO: 12 FL (ref 7.4–10.4)
POTASSIUM SERPL-SCNC: 3.7 MMOL/L (ref 3.5–5.1)
PROT SERPL-MCNC: 6.8 G/DL (ref 6.1–8.1)
RBC # BLD AUTO: 4.23 10^6/UL (ref 4.2–5.4)
SODIUM SERPL-SCNC: 137 MMOL/L (ref 135–144)
WBC # BLD AUTO: 14.2 10^3/UL (ref 4.8–10.8)

## 2017-07-03 PROCEDURE — 82150 ASSAY OF AMYLASE: CPT

## 2017-07-03 PROCEDURE — 76705 ECHO EXAM OF ABDOMEN: CPT

## 2017-07-03 PROCEDURE — 76817 TRANSVAGINAL US OBSTETRIC: CPT

## 2017-07-03 PROCEDURE — 80069 RENAL FUNCTION PANEL: CPT

## 2017-07-03 PROCEDURE — 86021 WBC ANTIBODY IDENTIFICATION: CPT

## 2017-07-03 PROCEDURE — 86038 ANTINUCLEAR ANTIBODIES: CPT

## 2017-07-03 PROCEDURE — 86592 SYPHILIS TEST NON-TREP QUAL: CPT

## 2017-07-03 PROCEDURE — 86255 FLUORESCENT ANTIBODY SCREEN: CPT

## 2017-07-03 PROCEDURE — G0463 HOSPITAL OUTPT CLINIC VISIT: HCPCS

## 2017-07-03 PROCEDURE — 88307 TISSUE EXAM BY PATHOLOGIST: CPT

## 2017-07-03 PROCEDURE — 86803 HEPATITIS C AB TEST: CPT

## 2017-07-03 PROCEDURE — 86850 RBC ANTIBODY SCREEN: CPT

## 2017-07-03 PROCEDURE — 94760 N-INVAS EAR/PLS OXIMETRY 1: CPT

## 2017-07-03 PROCEDURE — 36415 COLL VENOUS BLD VENIPUNCTURE: CPT

## 2017-07-03 PROCEDURE — 86901 BLOOD TYPING SEROLOGIC RH(D): CPT

## 2017-07-03 PROCEDURE — 86709 HEPATITIS A IGM ANTIBODY: CPT

## 2017-07-03 PROCEDURE — 76818 FETAL BIOPHYS PROFILE W/NST: CPT

## 2017-07-03 PROCEDURE — 83735 ASSAY OF MAGNESIUM: CPT

## 2017-07-03 PROCEDURE — 85610 PROTHROMBIN TIME: CPT

## 2017-07-03 PROCEDURE — 74176 CT ABD & PELVIS W/O CONTRAST: CPT

## 2017-07-03 PROCEDURE — 84075 ASSAY ALKALINE PHOSPHATASE: CPT

## 2017-07-03 PROCEDURE — 76815 OB US LIMITED FETUS(S): CPT

## 2017-07-03 PROCEDURE — 86704 HEP B CORE ANTIBODY TOTAL: CPT

## 2017-07-03 PROCEDURE — 85384 FIBRINOGEN ACTIVITY: CPT

## 2017-07-03 PROCEDURE — 83690 ASSAY OF LIPASE: CPT

## 2017-07-03 PROCEDURE — 85025 COMPLETE CBC W/AUTO DIFF WBC: CPT

## 2017-07-03 PROCEDURE — 85730 THROMBOPLASTIN TIME PARTIAL: CPT

## 2017-07-03 PROCEDURE — 80306 DRUG TEST PRSMV INSTRMNT: CPT

## 2017-07-03 PROCEDURE — 86900 BLOOD TYPING SEROLOGIC ABO: CPT

## 2017-07-03 PROCEDURE — 80053 COMPREHEN METABOLIC PANEL: CPT

## 2017-07-03 PROCEDURE — 87340 HEPATITIS B SURFACE AG IA: CPT

## 2017-07-03 PROCEDURE — 84450 TRANSFERASE (AST) (SGOT): CPT

## 2017-07-03 PROCEDURE — 85362 FIBRIN DEGRADATION PRODUCTS: CPT

## 2017-07-03 PROCEDURE — 84460 ALANINE AMINO (ALT) (SGPT): CPT

## 2017-07-03 PROCEDURE — 83615 LACTATE (LD) (LDH) ENZYME: CPT

## 2017-07-03 PROCEDURE — 80076 HEPATIC FUNCTION PANEL: CPT

## 2017-07-03 PROCEDURE — 84156 ASSAY OF PROTEIN URINE: CPT

## 2017-07-03 PROCEDURE — 81001 URINALYSIS AUTO W/SCOPE: CPT

## 2017-07-03 RX ADMIN — Medication SCH TAB: at 09:50

## 2017-07-03 RX ADMIN — CEPHALEXIN SCH MG: 500 CAPSULE ORAL at 01:01

## 2017-07-03 RX ADMIN — DEXAMETHASONE SODIUM PHOSPHATE PRN MG: 10 INJECTION, SOLUTION INTRAMUSCULAR; INTRAVENOUS at 22:55

## 2017-07-03 RX ADMIN — CEPHALEXIN SCH MG: 500 CAPSULE ORAL at 06:17

## 2017-07-03 NOTE — RADRPT
PROCEDURE:   Biophysical profile 

 

CLINICAL INDICATION:   Fetal distress.  Decreased fetal movements.  Abdominal pain. 

 

TECHNIQUE:   Color and gray-scale ultrasound images of an intrauterine gestation were obtained. 

 

COMPARISON:   June 29, 2017 

 

FINDINGS:

A single live intrauterine gestation is identified in cephalic position with an estimated fetal hear
t rate of 139 beats per minute.  The placenta is located posteriorly and is a grade II.  The cervix 
is obscured by head shadows.  No evidence of  abruption identified.  AMITA is  7.8 cm.

 

Fetal movement 2/2.

Fetal tone 2/2.

Fetal breathing movement 2/2.

Qualitative AFV 2/2

 

Total biophysical profile 8/8 

 

IMPRESSION:

8/8 biophysical profile. 

 

 

RPTAT: AA

_____________________________________________ 

.Nuno Andres MD, MD           Date    Time 

Electronically viewed and signed by .Nuno Andres MD, MD on 07/03/2017 11:27 

 

D:  07/03/2017 11:27  T:  07/03/2017 11:27

.P/

## 2017-07-03 NOTE — PD.PPDC
OB/GYN Discharge Instruction


Condition


Patient Condition:  Good





Diet


Diet:  Resume Regular Diet





Activity/Restrictions








Activity:   Normal Activity











Follow-up


Follow-up with Physician:  2, Day/Days





Return to clinic for








GYN Instructions:   Fever greater than 101





 Chills





 Worsening abdominal pain





 Excessive Vaginal Bleeding














OB Instructions:   Depression





 Blurried Vision





 Headache

















JENNYFER FRANCIS MD Jul 3, 2017 11:05

## 2017-07-04 LAB
ADD UMIC: YES
ALBUMIN SERPL-MCNC: 3.1 G/DL (ref 3.3–4.9)
ALBUMIN SERPL-MCNC: 3.2 G/DL (ref 3.3–4.9)
ALBUMIN SERPL-MCNC: 3.3 G/DL (ref 3.3–4.9)
ALP SERPL-CCNC: 135 IU/L (ref 42–121)
ALP SERPL-CCNC: 136 IU/L (ref 42–121)
ALP SERPL-CCNC: 142 IU/L (ref 42–121)
ALT SERPL-CCNC: 137 IU/L (ref 13–69)
ALT SERPL-CCNC: 138 IU/L (ref 13–69)
ALT SERPL-CCNC: 138 IU/L (ref 13–69)
AST SERPL-CCNC: 141 IU/L (ref 15–46)
AST SERPL-CCNC: 149 IU/L (ref 15–46)
AST SERPL-CCNC: 158 IU/L (ref 15–46)
BILIRUB DIRECT SERPL-MCNC: 0 MG/DL (ref 0–0.2)
BILIRUB SERPL-MCNC: 0.1 MG/DL (ref 0.2–1.3)
BILIRUB SERPL-MCNC: 0.2 MG/DL (ref 0.2–1.3)
BILIRUB SERPL-MCNC: 0.2 MG/DL (ref 0.2–1.3)
COLOR UR: YELLOW
GLUCOSE UR STRIP-MCNC: NEGATIVE MG/DL
KETONES UR STRIP.AUTO-MCNC: NEGATIVE MG/DL
MUCOUS THREADS #/AREA URNS HPF: (no result) /HPF
NITRITE UR QL STRIP.AUTO: NEGATIVE MG/DL
PROT SERPL-MCNC: 5.8 G/DL (ref 6.1–8.1)
PROT SERPL-MCNC: 6 G/DL (ref 6.1–8.1)
PROT SERPL-MCNC: 6.1 G/DL (ref 6.1–8.1)
RBC # UR AUTO: NEGATIVE MG/DL
SQUAMOUS #/AREA URNS HPF: (no result) /HPF
UR ASCORBIC ACID: NEGATIVE MG/DL
UR BILIRUBIN (DIP): NEGATIVE MG/DL
UR CLARITY: (no result)
UR PH (DIP): 6 (ref 5–9)
UR RBC: 1 /HPF (ref 0–5)
UR SPECIFIC GRAVITY (DIP): 1.02 (ref 1–1.03)
UR TOTAL PROTEIN (DIP): (no result) MG/DL
UROBILINOGEN UR STRIP-ACNC: NEGATIVE MG/DL
WBC # UR STRIP: (no result) LEU/UL

## 2017-07-04 RX ADMIN — MEPERIDINE HYDROCHLORIDE PRN MG: 50 INJECTION, SOLUTION INTRAMUSCULAR; INTRAVENOUS; SUBCUTANEOUS at 19:23

## 2017-07-04 RX ADMIN — MEPERIDINE HYDROCHLORIDE PRN MG: 50 INJECTION, SOLUTION INTRAMUSCULAR; INTRAVENOUS; SUBCUTANEOUS at 05:13

## 2017-07-04 RX ADMIN — DIPHENHYDRAMINE HYDROCHLORIDE PRN MG: 50 INJECTION, SOLUTION INTRAMUSCULAR; INTRAVENOUS at 19:23

## 2017-07-04 RX ADMIN — MEPERIDINE HYDROCHLORIDE PRN MG: 50 INJECTION, SOLUTION INTRAMUSCULAR; INTRAVENOUS; SUBCUTANEOUS at 11:13

## 2017-07-04 RX ADMIN — DIPHENHYDRAMINE HYDROCHLORIDE PRN MG: 50 INJECTION, SOLUTION INTRAMUSCULAR; INTRAVENOUS at 05:15

## 2017-07-04 RX ADMIN — PYRIDOXINE HYDROCHLORIDE SCH MLS/HR: 100 INJECTION, SOLUTION INTRAMUSCULAR; INTRAVENOUS at 23:55

## 2017-07-04 RX ADMIN — DIPHENHYDRAMINE HYDROCHLORIDE PRN MG: 50 INJECTION, SOLUTION INTRAMUSCULAR; INTRAVENOUS at 23:55

## 2017-07-04 RX ADMIN — MEPERIDINE HYDROCHLORIDE PRN MG: 50 INJECTION, SOLUTION INTRAMUSCULAR; INTRAVENOUS; SUBCUTANEOUS at 08:12

## 2017-07-04 RX ADMIN — DIPHENHYDRAMINE HYDROCHLORIDE PRN MG: 50 INJECTION, SOLUTION INTRAMUSCULAR; INTRAVENOUS at 14:52

## 2017-07-04 RX ADMIN — PYRIDOXINE HYDROCHLORIDE SCH MLS/HR: 100 INJECTION, SOLUTION INTRAMUSCULAR; INTRAVENOUS at 04:11

## 2017-07-04 RX ADMIN — MEPERIDINE HYDROCHLORIDE PRN MG: 50 INJECTION, SOLUTION INTRAMUSCULAR; INTRAVENOUS; SUBCUTANEOUS at 02:37

## 2017-07-04 RX ADMIN — DIPHENHYDRAMINE HYDROCHLORIDE PRN MG: 50 INJECTION, SOLUTION INTRAMUSCULAR; INTRAVENOUS at 01:59

## 2017-07-04 RX ADMIN — PYRIDOXINE HYDROCHLORIDE SCH MLS/HR: 100 INJECTION, SOLUTION INTRAMUSCULAR; INTRAVENOUS at 11:03

## 2017-07-04 RX ADMIN — PYRIDOXINE HYDROCHLORIDE SCH MLS/HR: 100 INJECTION, SOLUTION INTRAMUSCULAR; INTRAVENOUS at 17:05

## 2017-07-04 RX ADMIN — MEPERIDINE HYDROCHLORIDE PRN MG: 50 INJECTION, SOLUTION INTRAMUSCULAR; INTRAVENOUS; SUBCUTANEOUS at 23:55

## 2017-07-04 RX ADMIN — MEPERIDINE HYDROCHLORIDE PRN MG: 50 INJECTION, SOLUTION INTRAMUSCULAR; INTRAVENOUS; SUBCUTANEOUS at 01:36

## 2017-07-04 RX ADMIN — MEPERIDINE HYDROCHLORIDE PRN MG: 50 INJECTION, SOLUTION INTRAMUSCULAR; INTRAVENOUS; SUBCUTANEOUS at 14:51

## 2017-07-04 NOTE — HP
Date/Time of Note


Date/Time of Note


DATE: 17 


TIME: 01:01





OB - History


Hx of Present Pregnancy


Free Text/Dictation


2017


Chief Complaint:  Right low back pain radiating to the R lower abdomen, 

Epigastric pain


:  1


Para:  0


Spontaneous :  0


Obstetrical Complications:  None


Other Pregnancy Concerns:


Patient presented today again to triage after discharged home. 


31 years old  with IUP at 33 weeks with Prenatal care with Dr. Vergara 

admitted on 2017 due to abdominal pain. had been hospitalized until 

yesterday. S/p MRI of the abdomen and pelvis and renal ultrasound. consistent 

with mild hydroureter otherwise non significant.


Patient discharged home today. Presented again last night with lower back pain 

with radiation to the RLQ as well as Nausea and vomiting and RUQ pain.


Denies any fever or chills. she rates her pain 9/10. received a dose of 

morphine in triage and her pain decreased to 6./10.


Denies any LOF, vaginal bleeding or decreased fetal movement or 

contractions..pregnancy at 33 weeks. FHT noted to be cat 1.


MRI of the abdomen 3 days ago, could not see the appendix, how ever there was 

not clear secondary evidence of appendicitis. There was no clear evidence of 

cholecystitis in MRI. Mild Right pyelectasis noted as well. 


Labs shows elevated WBC as well as LFT. Her blood pressure noted to be elevated 

in 140-150/80's when she was in pain.


Patient denies any complication during her prenatal course.





Past Family/Social History


*


Past Medical, Surgical, Family and Obstetric Histories reviewed from prenatal 

chart.





OB  Admission Exam


Physical Exam


HEENT:  WNL


Heart:  Rhythm Normal


Lungs:  Clear


Abdomen:  Abnormal (There is tenderness in the RUQ as well as tenderness in the 

RLQ and rebound tenderness in the RLQ , ? [place] area. Abdomen is soft, no 

guarding, no rigidity, No CVA tenderness, no uterine tenderness. )


Extremities:  Normal


Membranes:  Intact


Fetal Heart Rate:  130's


Last 72 hourBlood Glucose


PROCEDURE:   Biophysical profile 


 


CLINICAL INDICATION:   Fetal distress.  Decreased fetal movements.  Abdominal 

pain. 


 


TECHNIQUE:   Color and gray-scale ultrasound images of an intrauterine 

gestation were obtained. 


 


COMPARISON:   2017 


 


FINDINGS:


A single live intrauterine gestation is identified in cephalic position with an 

estimated fetal heart rate of 139 beats per minute.  The placenta is located 

posteriorly and is a grade II.  The cervix is obscured by head shadows.  No 

evidence of  abruption identified.  AMITA is  7.8 cm.


 


Fetal movement 2/2.


Fetal tone 2/2.


Fetal breathing movement 2/2.


Qualitative AFV 2/2


 


Total biophysical profile  


 


IMPRESSION:


 biophysical profile.


Last 72 hours Lab Results


 CBC & BMP


7/3/17 22:40











 Liver Function








Test


  7/3/17


22:40


 


Alanine Aminotransferase


(ALT/SGPT) 156  H


 


 


Albumin 3.7  


 


Alkaline Phosphatase 150  H


 


Aspartate Amino Transf


(AST/SGOT) 139  H


 


 


Direct Bilirubin 0.00  


 


Total Protein 6.8  











OB  Assessment/Plan


Other Assessment:


IUP at 35 weeks and 2 days by her reported RONEL . Growth ultrasound requested 

and pending. Patient started prenatal care late. 


Epigastric pain and RUQ pain, Nausea, Elevated WBC increasing, increasing  LFT, 

can not r/o cholecystitis.since MRI is not specific for diagnosis of the gall 

bladder disease. will order a RUQ ultrasound. Patient appears to have some 

inflammatory process. GI consult placed. Ancef started.


RLQ pain and tenderness, + rebound tenderness. R/o appendicitis. CRP pending.


Ultrasound of the RUQ , RLQ and renal ultrasound ordered General surgery 

consultation. Discussed and consulted  with Dr. Fontenot. Agreed to evaluate for 

appendicitis


Elevated blood pressure, likely related to pain.Will continue to monitor 

closely.


Other plan:


Ancef 2 gram IV Q 6 hours


Repeat LFT every 6 hours for the trend


Pain control. allergic to stadol . Consider Demerol 50 mg Q 3 hours 


Follow up with General surgery and GI consultation 


Follow up with Abdominal us ( RUQ and RLQ and renal us ).


CRP pending.


Continuos monitoring. 


UA pending.


Perinatology consultation tomorrow.











WILMER BACA MD 2017 01:12

## 2017-07-04 NOTE — HP
Date/Time of Note


Date/Time of Note


DATE: 17 


TIME: 08:22





Assessment/Plan


VTE Prophylaxis


VTE Prophylaxis Intervention:  ambulation





Assessment/Plan


Assessment/Plan


Unclear etiology of abdominal pain


Appendicitis is very unlikely based on MRI findings as well as the fact the 

symptoms have been going on for 2 week


Doubt gallbladder as well as patient is no evidence of cholecystitis on 

ultrasound.


If still clinically concerned, recommend potentially repeating MRI





HPI/ROS


Admit Date/Time


Admit Date/Time


Jul 3, 2017 at 23:45





Hx of Present Illness


The patient is a 32-year-old female who is 32 weeks pregnant.  She is 

complaining of a two-week history of abdominal pain, mostly localized to right 

upper quadrant as well as right flank back pain.  She denies nausea or 

vomiting.  She denies fevers, chills or night sweats.  She was hospitalized at 

[facility] for 4 days and was discharged home recently.  She returned back to 

the ER last night.  Due to the concern for appendicitis, I was called for 

consultation.





This is the patient's first pregnancy.  She denies prior episodes of pain 

before this 2 week history.





PMH/Family/Social


Past Medical History


Medical History:  no pertinent history





Past Surgical History


Past Surgical Hx:  no surgical history





Family History


Significant Family History:  no pertinent family hx





Social History


Alcohol Use:  none


Smoking Status:  Never smoker


Drug Use:  none





Exam/Review of Systems


Vital Signs


Vitals











 Intake and Output   


 


 7/3/17 7/3/17 7/4/17





 15:00 23:00 07:00


 


Intake Total   1150 ml


 


Output Total   550 ml


 


Balance   600 ml











Exam


Constitutional:  alert, oriented, well developed


Psych:  no complaints


Head:  normocephalic


Eyes:  nl conjunctiva


ENMT:  nl external ears & nose


Neck:  supple


Respiratory:  clear to auscultation


Cardiovascular:  regular rate and rhythm


Gastrointestinal:  soft (Consistent with 32 week gravid uterus, some right 

upper quadrant tenderness, but most tenderness is over the right ribs)


Extremities:  normal pulses


Neurological:  CNS II-XII intact


Skin:  nl turgor





Labs


Result Diagram:  


7/3/17 2240                                                                    

            7/3/17 2240








Medications


Medications





 Current Medications


Ondansetron HCl 4 mg 4 mg Q4H  PRN IV NAUSEA AND/OR VOMITING Last administered 

on 7/3/17at 22:55; Admin Dose 4 MG;  Start 7/3/17 at 22:30


Cefazolin Sodium/ Dextrose (Ancef 2 Gm/50 ml (Pmx)) 50 ml @  100 mls/hr Q6 IVPB

  Last administered on  06:02; Admin Dose 100 MLS/HR;  Start 17 at 

01:00


Meperidine HCl (Demerol) 50 mg Q3  PRN IV PAIN Last administered on  08:

12; Admin Dose 50 MG;  Start 17 at 01:30


Diphenhydramine HCl 25 mg 25 mg Q3  PRN IV PAIN Last administered on  05

:15; Admin Dose 25 MG;  Start 17 at 01:30


Lactated Ringer's (Lr) 1,000 ml @  150 mls/hr Q6H40M IV  Last administered on  04:11; Admin Dose 150 MLS/HR;  Start 17 at 01:00





Procedures


Procedures


Patient: CHARLIE FERNANDEZ   : 1985   Age: 31  Sex: F                     

   


 MR #:    [id number]   Acct #:   [id number]    DOS: 17 0000


 Ordering MD: WILMER BACA MD   [place]  [facility] [place]   Room/Bed:  [facility]          

                                


 








PROCEDURE:    Abdominal ultrasound, limited. 


 


CLINICAL INDICATION:    Abdominal pain. 


 


TECHNIQUE:    Multiple real-time images were acquired of the patient's right 

upper abdomen utilizing a high resolution transducer. 


 


COMPARISON:   2017. 


 


FINDINGS:


The liver demonstrates normal echogenicity and size measuring 15.1 cm.  There 

is no focal mass or intrahepatic biliary ductal dilatation.  The portal vein is 

patent.  The gallbladder is not distended.  No gallstones are identified. 

Echogenic sludge is seen within the gallbladder. There is no pericholecystic 

fluid or gallbladder wall thickening.  The common bile duct measures 3.2 mm in 

maximal dimension.  The pancreas is obscured by overlying bowel gas.  No free 

fluid is identified.


 


The right kidney is normal size and echogenicity measuring 10.1 cm.  There is 

no focal renal mass or echogenic calculus identified.  There is no obstructive 

uropathy.  


 


IMPRESSION:


Gallbladder sludge without ultrasound evidence of cholecystitis.


Pancreas obscured by overlying bowel gas.








 Patient: CHARLIE FERNANDEZ   : 1985   Age: 31  Sex: F                    

    


 MR #:    [id number]   Acct #:   [id number]    DOS: 17 0000


 Ordering MD: YANET MCCABE M.D.   [place]  [facility] [place]   Room/Bed:  [facility]        

                                  


 








PROCEDURE:   


MRI Abdomen without contrast. 


 


CLINICAL INDICATION:   


Abdominal pain.  There is a question of appendicitis.  The patient is 34 weeks 

pregnant.


 


TECHNIQUE:   


Multiplanar and multisequence MRI of the abdomen was performed without contrast.


 


COMPARISON:   


Ultrasounds dated 2017.  


 


FINDINGS:


 


There is a single intrauterine pregnancy with a cephalic lie and a left lateral 

placenta.  The ovaries and adnexa are unremarkable bilaterally.


 


The appendix is not clearly identified but there is no inflammatory change or 

fluid in the right lower quadrant or right mid abdomen adjacent to the cecum.  

The liver, gallbladder, spleen, pancreas, and adrenal glands are unremarkable.  

There is no intra or extrahepatic biliary ductal dilatation.  There is mild 

right hydroureteronephrosis, likely physiologic.  The visualized bowel 

demonstrates no wall thickening or evidence of obstruction.


 


IMPRESSION:


1.  Nonvisualization of the appendix, but without secondary findings to suggest 

acute appendicitis.


2.  Single intrauterine pregnancy.


3.  Mild right hydroureteronephrosis, unchanged when compared the recent 

abdominal ultrasound, given the difference in technique.











WILTON MONTANA MD 2017 08:24

## 2017-07-04 NOTE — CONS
Date/Time of Note


Date/Time of Note


DATE: 7/4/17 


TIME: 10:44





Consultation Date/Type/Reason


Admit Date/Time


July 4, 2017


High risk hospital consult


Initial Consult Date


This patient is 31 years old primigravida with due date of August 6, 2017 which 

makes her  about 33 weeks and 1 day.


She was hospitalized 3 days ago with complaint of abdominal pain ,right lower 

quadrant pain and  right upper quadrant pain Two  days ago had some workup 

including ultrasound MRI and blood work .


Except for slightly elevated WBC and a slightly high SGOT and SGPT no other 

abnormal finding  and she was discharged home., but she  returned  again 

yesterday afternoon complaining of the pain over mostly on the right side of 

the abdomen.


On examination she has some tenderness of the right upper quadrant as well as 

right lower quadrant and right costal vertebral angle.


Occasional contraction.  Uterus is basically soft.  Fetal heart tone is normal


Fetal heart tracing is normal no deceleration no anomaly.


Her temperature is normal, heart rate is within normal limits


On pelvic examination 2 days ago there was no vaginal bleeding cervix was closed


On MRI there was reported right hydroureterocalyasis !  No evidence of 

cholecystitis or cholelithiasis, only some sludge in the gallbladder


Kidneys were basically reported normal right kidney slightly larger.


Bowel sounds are normal





 Laboratory Tests








Test


  7/3/17


21:30 7/3/17


22:40 7/4/17


06:30


 


Urine Color YELLOW   


 


Urine Clarity


  SLIGHTLY


CLOUDY 


  


 


 


Urine pH 6.0   


 


Urine Specific Gravity 1.025   


 


Urine Ketones NEGATIVEmg/dL   


 


Urine Nitrite NEGATIVEmg/dL   


 


Urine Bilirubin NEGATIVEmg/dL   


 


Urine Urobilinogen NEGATIVEmg/dL   


 


Urine Leukocyte Esterase 1+Wilian/ul   


 


Urine Microscopic RBC 1/HPF   


 


Urine Microscopic WBC 3/HPF   


 


Urine Squamous Epithelial


Cells FEW/HPF 


  


  


 


 


Urine Mucus FEW/HPF   


 


Urine Hemoglobin NEGATIVEmg/dL   


 


Urine Glucose NEGATIVEmg/dL   


 


Urine Total Protein 1+mg/dl   


 


White Blood Count  14.210^3/ul  


 


Red Blood Count  4.2310^6/ul  


 


Hemoglobin  13.1g/dl  


 


Hematocrit  37.8%  


 


Mean Corpuscular Volume  89.4fl  


 


Mean Corpuscular Hemoglobin  31.0pg  


 


Mean Corpuscular Hemoglobin


Concent 


  34.7g/dl 


  


 


 


Red Cell Distribution Width  14.7%  


 


Platelet Count  33419^3/UL  


 


Mean Platelet Volume  12.0fl  


 


Neutrophils %  70.9%  


 


Lymphocytes %  19.9%  


 


Monocytes %  7.7%  


 


Eosinophils %  0.2%  


 


Basophils %  0.2%  


 


Nucleated Red Blood Cells %  0.3/100WBC  


 


Neutrophils #  10.110^3/ul  


 


Lymphocytes #  2.810^3/ul  


 


Monocytes #  1.110^3/ul  


 


Eosinophils #  0.010^3/ul  


 


Basophils #  0.010^3/ul  


 


Nucleated Red Blood Cells #  0.010^3/ul  


 


Sodium Level  137mmol/L  


 


Potassium Level  3.7mmol/L  


 


Chloride Level  98mmol/L  


 


Carbon Dioxide Level  25mmol/L  


 


Anion Gap  18  


 


Blood Urea Nitrogen  11mg/dl  


 


Creatinine  0.57mg/dl  


 


Glucose Level  85mg/dl  


 


Calcium Level  9.3mg/dl  


 


Total Bilirubin  0.1mg/dl  0.1mg/dl 


 


Direct Bilirubin  0.00mg/dl  0.00mg/dl 


 


Indirect Bilirubin  0.1mg/dl  0.1mg/dl 


 


Aspartate Amino Transf


(AST/SGOT) 


  139IU/L 


  141IU/L 


 


 


Alanine Aminotransferase


(ALT/SGPT) 


  156IU/L 


  137IU/L 


 


 


Alkaline Phosphatase  150IU/L  135IU/L 


 


Total Protein  6.8g/dl  6.0g/dl 


 


Albumin  3.7g/dl  3.2g/dl 


 


Globulin  3.10g/dl  


 


Albumin/Globulin Ratio  1.19  


 


Amylase Level  120U/L  


 


Lipase  97U/L  








 Current Medications








 Medications


  (Trade)  Dose


 Ordered  Sig/Ji


 Route


 PRN Reason  Start Time


 Stop Time Status Last Admin


Dose Admin


 


 Morphine Sulfate


  (morphine)  3 mg  Q3H  PRN


 IV


 PAIN  7/3/17 22:30


 7/4/17 07:30 DC 7/3/17 22:54


3 MG


 


 Ondansetron HCl 4


 mg  4 mg  Q4H  PRN


 IV


 NAUSEA AND/OR VOMITING  7/3/17 22:30


    7/3/17 22:55


4 MG


 


 Lactated Ringer's  1,000 ml @ 


 1,000 mls/hr  Q1H ONCE


 IV


   7/3/17 22:30


 7/3/17 23:29 DC 7/3/17 23:00


1,000 MLS/HR


 


 Lactated Ringer's  1,000 ml @ 


 125 mls/hr  Q8H


 IV


   7/4/17 00:00


 7/4/17 03:47 DC 7/4/17 00:04


125 MLS/HR


 


 Lactated Ringer's


  (Lr)  1,000 ml @ 


 125 mls/hr  Q8H


 IV


   7/3/17 23:51


 7/4/17 03:47 DC  


 


 


 Hydromorphone HCl


 2 mg  2 mg  Q3  PRN


 IV


 PAIN  7/4/17 01:55


 7/4/17 01:55 DC  


 


 


 Ampicillin  100 ml @ 


 100 mls/hr  Q6


 IVPB


   7/4/17 01:00


 7/4/17 01:00 DC  


 


 


 Cefazolin Sodium/


 Dextrose


  (Ancef 2 Gm/50


 ml (Pmx))  50 ml @ 


 100 mls/hr  Q6


 IVPB


   7/4/17 01:00


    7/4/17 06:02


100 MLS/HR


 


 Meperidine HCl


  (Demerol)  50 mg  Q3  PRN


 IV


 PAIN  7/4/17 01:30


    7/4/17 08:12


50 MG


 


 Hydroxyzine HCl


  (Vistaril Inj)  25 mg  Q3H  PRN


 IM


 PAIN  7/4/17 01:30


 7/4/17 01:32 DC  


 


 


 Diphenhydramine


 HCl 25 mg  25 mg  Q3  PRN


 IV


 PAIN  7/4/17 01:30


    7/4/17 05:15


25 MG


 


 Lactated Ringer's


  (Lr)  1,000 ml @ 


 150 mls/hr  Q6H40M


 IV


   7/4/17 01:00


    7/4/17 04:11


150 MLS/HR











24 HR Interval Summary


Free Text/Dictation


We requested a surgical consult.  Surgeon has seen her and is not impressed 

with any findings regarding the appendicitis or  hydroureterocalyasis and 

recommended to have  repeat MRI.


Today she is taking 50 mg of Demerol every 3 hours because of her pain.


We will discuss her case with the Dr. Almonte .


Constitutional:  No chills, No diaphoresis, No disoriented, No febrile, No 

improved, No no complaints, No other, No poor po, No requiring IVF, No 

requiring O2





Exam/Review of Systems


Vital Signs


Vitals











 Intake and Output   


 


 7/3/17 7/3/17 7/4/17





 15:00 23:00 07:00


 


Intake Total   1150 ml


 


Output Total   550 ml


 


Balance   600 ml











Results


Result Diagram:  


7/3/17 2240                                                                    

            7/3/17 2240





Results 24 hrs





Laboratory Tests








Test


  7/3/17


21:30 7/3/17


22:40 7/4/17


06:30


 


Urine Color YELLOW    


 


Urine Clarity


  SLIGHTLY


CLOUDY  A 


  


 


 


Urine pH 6.0    


 


Urine Specific Gravity 1.025    


 


Urine Ketones NEGATIVE    


 


Urine Nitrite NEGATIVE    


 


Urine Bilirubin NEGATIVE    


 


Urine Urobilinogen NEGATIVE    


 


Urine Leukocyte Esterase 1+  H  


 


Urine Microscopic RBC 1    


 


Urine Microscopic WBC 3    


 


Urine Squamous Epithelial


Cells FEW  


  


  


 


 


Urine Mucus FEW  A  


 


Urine Hemoglobin NEGATIVE    


 


Urine Glucose NEGATIVE    


 


Urine Total Protein 1+  H  


 


White Blood Count  14.2  H 


 


Red Blood Count  4.23   


 


Hemoglobin  13.1   


 


Hematocrit  37.8   


 


Mean Corpuscular Volume  89.4   


 


Mean Corpuscular Hemoglobin  31.0   


 


Mean Corpuscular Hemoglobin


Concent 


  34.7  


  


 


 


Red Cell Distribution Width  14.7  H 


 


Platelet Count  275   


 


Mean Platelet Volume  12.0  H 


 


Neutrophils %  70.9   


 


Lymphocytes %  19.9   


 


Monocytes %  7.7   


 


Eosinophils %  0.2   


 


Basophils %  0.2   


 


Nucleated Red Blood Cells %  0.3  H 


 


Neutrophils #  10.1  H 


 


Lymphocytes #  2.8   


 


Monocytes #  1.1  H 


 


Eosinophils #  0.0   


 


Basophils #  0.0   


 


Nucleated Red Blood Cells #  0.0   


 


Sodium Level  137   


 


Potassium Level  3.7   


 


Chloride Level  98   


 


Carbon Dioxide Level  25   


 


Anion Gap  18  H 


 


Blood Urea Nitrogen  11   


 


Creatinine  0.57   


 


Glucose Level  85   


 


Calcium Level  9.3   


 


Total Bilirubin  0.1  L 0.1  L


 


Direct Bilirubin  0.00   0.00  


 


Indirect Bilirubin  0.1   0.1  


 


Aspartate Amino Transf


(AST/SGOT) 


  139  H


  141  H


 


 


Alanine Aminotransferase


(ALT/SGPT) 


  156  H


  137  H


 


 


Alkaline Phosphatase  150  H 135  H


 


Total Protein  6.8   6.0  L


 


Albumin  3.7   3.2  L


 


Globulin  3.10   


 


Albumin/Globulin Ratio  1.19   


 


Amylase Level  120   


 


Lipase  97   











Medications


Medications





 Current Medications


Ondansetron HCl 4 mg 4 mg Q4H  PRN IV NAUSEA AND/OR VOMITING Last administered 

on 7/3/17at 22:55; Admin Dose 4 MG;  Start 7/3/17 at 22:30


Cefazolin Sodium/ Dextrose (Ancef 2 Gm/50 ml (Pmx)) 50 ml @  100 mls/hr Q6 IVPB

  Last administered on 7/4/17at 06:02; Admin Dose 100 MLS/HR;  Start 7/4/17 at 

01:00


Meperidine HCl (Demerol) 50 mg Q3  PRN IV PAIN Last administered on 7/4/17at 08:

12; Admin Dose 50 MG;  Start 7/4/17 at 01:30


Diphenhydramine HCl 25 mg 25 mg Q3  PRN IV PAIN Last administered on 7/4/17at 05

:15; Admin Dose 25 MG;  Start 7/4/17 at 01:30


Lactated Ringer's (Lr) 1,000 ml @  150 mls/hr Q6H40M IV  Last administered on 7/ 4/17at 04:11; Admin Dose 150 MLS/HR;  Start 7/4/17 at 01:00











HIPOLITO COPPOLA MD Jul 4, 2017 10:54

## 2017-07-04 NOTE — RADRPT
PROCEDURE:   US OB. 

 

CLINICAL INDICATION:   Pain. 

 

TECHNIQUE:   Multiple sonographic images of the pelvis were obtained.  Transabdominal imaging only w
as performed.  The images were reviewed on a PACS workstation. 

 

COMPARISON:   07/03/2017. 

 

FINDINGS:

Single live intrauterine pregnancy is identified.  Cardiac activity is present with 152 beats per mi
nute. 

There is a vertex presentation. 

Measurements:

  BPD = 32 weeks 1 day.

  HC = 32 weeks 2 days.

  AC = 30 weeks 1 day.

  FL = 33 weeks 1 day.

Estimated gestational age of approximately 32 weeks 0 days.  

The estimated date of delivery is 08/29/2017.  

The EFW = 1775 g which is at the 6.7 percentile.  

Limited evaluation of fetal anatomy demonstrates slightly elevated femur length to abdominal circumf
erence and femur length to head circumference..

The placenta is posterior fundal. There is no evidence for placenta previa.

Cervix is 3.6 cm in length and closed.

 

IMPRESSION:

 

Single live intrauterine gestation of approximately 32 weeks 0 days.  Slightly abnormal fetal anatom
y ratio as described above.  Recommend follow-up examination.

 

RPTAT:  HMVK

_____________________________________________ 

.Anthony López MD, MD           Date    Time 

Electronically viewed and signed by .Anthony López MD, MD on 07/04/2017 02:20 

 

D:  07/04/2017 02:20  T:  07/04/2017 02:20

.K/

## 2017-07-04 NOTE — RADRPT
PROCEDURE:   Abdominal ultrasound, limited. 

 

CLINICAL INDICATION:   Abdominal pain. 

 

TECHNIQUE:    Multiple real-time images were acquired of the right lower quadrant utilizing a high r
esolution transducer. 

 

COMPARISON:   None 

 

FINDINGS:

Normal compressible bowel is present.  There is no abnormal mass or fluid collection identified.  Th
e appendix is not visualized.   

 

IMPRESSION:

Appendix not visualized. 

 

If clinical concern for appendicitis persists, and MR or CT of the abdomen and pelvis without contra
st should be considered.

_____________________________________________ 

.Ariel Sapp MD, MD           Date    Time 

Electronically viewed and signed by .Ariel Sapp MD, MD on 07/04/2017 02:19 

 

D:  07/04/2017 02:19  T:  07/04/2017 02:19

.T/

## 2017-07-04 NOTE — RADRPT
PROCEDURE:    Abdominal ultrasound, limited. 

 

CLINICAL INDICATION:    Abdominal pain. 

 

TECHNIQUE:    Multiple real-time images were acquired of the patient's right upper abdomen utilizing
 a high resolution transducer. 

 

COMPARISON:   06/12/2017. 

 

FINDINGS:

The liver demonstrates normal echogenicity and size measuring 15.1 cm.  There is no focal mass or in
trahepatic biliary ductal dilatation.  The portal vein is patent.  The gallbladder is not distended.
  No gallstones are identified. Echogenic sludge is seen within the gallbladder. There is no pericho
lecystic fluid or gallbladder wall thickening.  The common bile duct measures 3.2 mm in maximal dime
nsion.  The pancreas is obscured by overlying bowel gas.  No free fluid is identified.

 

The right kidney is normal size and echogenicity measuring 10.1 cm.  There is no focal renal mass or
 echogenic calculus identified.  There is no obstructive uropathy.  

 

IMPRESSION:

Gallbladder sludge without ultrasound evidence of cholecystitis.

Pancreas obscured by overlying bowel gas.

_____________________________________________ 

.Ariel Sapp MD, MD           Date    Time 

Electronically viewed and signed by .Ariel Sapp MD, MD on 07/04/2017 02:19 

 

D:  07/04/2017 02:19  T:  07/04/2017 02:19

.T/

## 2017-07-05 LAB
ADD SCAN DIFF: NO
ADD UMIC: YES
ALBUMIN SERPL-MCNC: 3.2 G/DL (ref 3.3–4.9)
ALBUMIN SERPL-MCNC: 3.3 G/DL (ref 3.3–4.9)
ALBUMIN SERPL-MCNC: 3.4 G/DL (ref 3.3–4.9)
ALBUMIN SERPL-MCNC: 3.5 G/DL (ref 3.3–4.9)
ALBUMIN SERPL-MCNC: 3.5 G/DL (ref 3.3–4.9)
ALBUMIN SERPL-MCNC: 3.7 G/DL (ref 3.3–4.9)
ALBUMIN/GLOB SERPL: 1.2 {RATIO}
ALP SERPL-CCNC: 145 IU/L (ref 42–121)
ALP SERPL-CCNC: 153 IU/L (ref 42–121)
ALP SERPL-CCNC: 171 IU/L (ref 42–121)
ALP SERPL-CCNC: 173 IU/L (ref 42–121)
ALP SERPL-CCNC: 185 IU/L (ref 42–121)
ALP SERPL-CCNC: 186 IU/L (ref 42–121)
ALT SERPL-CCNC: 132 IU/L (ref 13–69)
ALT SERPL-CCNC: 140 IU/L (ref 13–69)
ALT SERPL-CCNC: 141 IU/L (ref 13–69)
ALT SERPL-CCNC: 142 IU/L (ref 13–69)
ALT SERPL-CCNC: 149 IU/L (ref 13–69)
ALT SERPL-CCNC: 161 IU/L (ref 13–69)
ANION GAP SERPL CALC-SCNC: 17 MMOL/L (ref 8–16)
APTT BLD: 27.5 SEC (ref 25–35)
AST SERPL-CCNC: 176 IU/L (ref 15–46)
AST SERPL-CCNC: 177 IU/L (ref 15–46)
AST SERPL-CCNC: 197 IU/L (ref 15–46)
AST SERPL-CCNC: 197 IU/L (ref 15–46)
AST SERPL-CCNC: 210 IU/L (ref 15–46)
AST SERPL-CCNC: 266 IU/L (ref 15–46)
BASOPHILS # BLD AUTO: 0 10^3/UL (ref 0–0.1)
BASOPHILS NFR BLD: 0.2 % (ref 0–2)
BILIRUB DIRECT SERPL-MCNC: 0 MG/DL (ref 0–0.2)
BILIRUB SERPL-MCNC: 0.2 MG/DL (ref 0.2–1.3)
BILIRUB SERPL-MCNC: 0.3 MG/DL (ref 0.2–1.3)
BUN SERPL-MCNC: 4 MG/DL (ref 7–20)
CALCIUM SERPL-MCNC: 8.9 MG/DL (ref 8.4–10.2)
CHLORIDE SERPL-SCNC: 96 MMOL/L (ref 97–110)
CO2 SERPL-SCNC: 24 MMOL/L (ref 21–31)
COLOR UR: (no result)
CREAT SERPL-MCNC: 0.52 MG/DL (ref 0.44–1)
EOSINOPHIL # BLD: 0.1 10^3/UL (ref 0–0.5)
EOSINOPHIL NFR BLD: 0.4 % (ref 0–7)
ERYTHROCYTE [DISTWIDTH] IN BLOOD BY AUTOMATED COUNT: 14.6 % (ref 11.5–14.5)
GLOBULIN SER-MCNC: 2.9 G/DL (ref 1.3–3.2)
GLUCOSE SERPL-MCNC: 67 MG/DL (ref 70–220)
GLUCOSE UR STRIP-MCNC: NEGATIVE MG/DL
HCT VFR BLD CALC: 39.5 % (ref 37–47)
HGB BLD-MCNC: 13.3 G/DL (ref 12–16)
INR PPP: 0.8
KETONES UR STRIP.AUTO-MCNC: (no result) MG/DL
LYMPHOCYTES # BLD AUTO: 2.7 10^3/UL (ref 0.8–2.9)
LYMPHOCYTES NFR BLD AUTO: 19.7 % (ref 15–51)
MCH RBC QN AUTO: 30.4 PG (ref 29–33)
MCHC RBC AUTO-ENTMCNC: 33.7 G/DL (ref 32–37)
MCV RBC AUTO: 90.4 FL (ref 82–101)
MONOCYTES # BLD: 0.9 10^3/UL (ref 0.3–0.9)
MONOCYTES NFR BLD: 6.4 % (ref 0–11)
NEUTROPHILS # BLD: 9.8 10^3/UL (ref 1.6–7.5)
NEUTROPHILS NFR BLD AUTO: 72.7 % (ref 39–77)
NITRITE UR QL STRIP.AUTO: NEGATIVE MG/DL
NRBC # BLD MANUAL: 0 10^3/UL (ref 0–0)
NRBC BLD QL: 0.1 /100WBC (ref 0–0)
PLATELET # BLD: 238 10^3/UL (ref 140–415)
PMV BLD AUTO: 11.8 FL (ref 7.4–10.4)
POTASSIUM SERPL-SCNC: 3.8 MMOL/L (ref 3.5–5.1)
PROT SERPL-MCNC: 5.9 G/DL (ref 6.1–8.1)
PROT SERPL-MCNC: 6 G/DL (ref 6.1–8.1)
PROT SERPL-MCNC: 6.2 G/DL (ref 6.1–8.1)
PROT SERPL-MCNC: 6.4 G/DL (ref 6.1–8.1)
PROT SERPL-MCNC: 6.4 G/DL (ref 6.1–8.1)
PROT SERPL-MCNC: 6.8 G/DL (ref 6.1–8.1)
PROTHROMBIN TIME: 11.1 SEC (ref 12.2–14.2)
PT RATIO: 0.9
RBC # BLD AUTO: 4.37 10^6/UL (ref 4.2–5.4)
RBC # UR AUTO: (no result) MG/DL
SODIUM SERPL-SCNC: 133 MMOL/L (ref 135–144)
SQUAMOUS #/AREA URNS HPF: (no result) /HPF
UR ASCORBIC ACID: NEGATIVE MG/DL
UR BILIRUBIN (DIP): NEGATIVE MG/DL
UR CLARITY: CLEAR
UR PH (DIP): 8 (ref 5–9)
UR RBC: 0 /HPF (ref 0–5)
UR SPECIFIC GRAVITY (DIP): 1 (ref 1–1.03)
UR TOTAL PROTEIN (DIP): NEGATIVE MG/DL
UROBILINOGEN UR STRIP-ACNC: NEGATIVE MG/DL
WBC # BLD AUTO: 13.4 10^3/UL (ref 4.8–10.8)
WBC # UR STRIP: (no result) LEU/UL

## 2017-07-05 RX ADMIN — MEPERIDINE HYDROCHLORIDE PRN MG: 50 INJECTION, SOLUTION INTRAMUSCULAR; INTRAVENOUS; SUBCUTANEOUS at 08:14

## 2017-07-05 RX ADMIN — DIPHENHYDRAMINE HYDROCHLORIDE PRN MG: 50 INJECTION, SOLUTION INTRAMUSCULAR; INTRAVENOUS at 08:47

## 2017-07-05 RX ADMIN — PYRIDOXINE HYDROCHLORIDE SCH MLS/HR: 100 INJECTION, SOLUTION INTRAMUSCULAR; INTRAVENOUS at 10:20

## 2017-07-05 RX ADMIN — DIPHENHYDRAMINE HYDROCHLORIDE PRN MG: 50 INJECTION, SOLUTION INTRAMUSCULAR; INTRAVENOUS at 04:22

## 2017-07-05 RX ADMIN — MEPERIDINE HYDROCHLORIDE PRN MG: 50 INJECTION, SOLUTION INTRAMUSCULAR; INTRAVENOUS; SUBCUTANEOUS at 04:22

## 2017-07-05 RX ADMIN — MAGNESIUM SULFATE IN WATER SCH MLS/HR: 40 INJECTION, SOLUTION INTRAVENOUS at 19:03

## 2017-07-05 RX ADMIN — DEXAMETHASONE SODIUM PHOSPHATE PRN MG: 10 INJECTION, SOLUTION INTRAMUSCULAR; INTRAVENOUS at 18:56

## 2017-07-05 RX ADMIN — MAGNESIUM SULFATE IN WATER SCH MLS/HR: 40 INJECTION, SOLUTION INTRAVENOUS at 09:16

## 2017-07-05 RX ADMIN — PYRIDOXINE HYDROCHLORIDE SCH MLS/HR: 100 INJECTION, SOLUTION INTRAMUSCULAR; INTRAVENOUS at 03:40

## 2017-07-05 RX ADMIN — MEPERIDINE HYDROCHLORIDE PRN MG: 50 INJECTION, SOLUTION INTRAMUSCULAR; INTRAVENOUS; SUBCUTANEOUS at 14:25

## 2017-07-05 RX ADMIN — PYRIDOXINE HYDROCHLORIDE SCH MLS/HR: 100 INJECTION, SOLUTION INTRAMUSCULAR; INTRAVENOUS at 12:52

## 2017-07-05 RX ADMIN — DIPHENHYDRAMINE HYDROCHLORIDE PRN MG: 50 INJECTION, SOLUTION INTRAMUSCULAR; INTRAVENOUS at 22:43

## 2017-07-05 NOTE — QN
Documentation


Job number:  Neonatology consultation


Comment


Asked for  consultation on 17.  Mother is a 31-year-old  1 

para 0 with the diagnosis of preeclampsia and was admitted with the right upper 

quadrant pain on 7/3/17.


Mother was started on magnesium sulfate and also Ancef every 6 hours.  Her labs 

are significant for elevated CBCs and also elevated liver enzymes.  She 

received 2 doses of betamethasone on  as well as .  Estimated fetal 

weight on ultrasound is 1775 g with a gestational age of 32 weeks.


Gestational age by dates is 33.2 weeks.





Mother was sedated just before arriving to the room with the moderate to good 

dose of Demerol therefore was very drowsy.  I spoke with mother for a short 

period of time and discuss quickly about what to expect as the infant is 

delivered.





I discussed about the fetus being 33.2 weeks with possibility of respiratory 

distress and treatment with oxygen administration high flow nasal cannula 

bubble CPAP as needed depending on infant's diagnosis of retained lung fluid or 

respiratory distress syndrome.  Also discussed about the infant being on IV 

fluids initially and to be started on feedings when the infant's respiratory 

status is stable.  Discussed about overall good prognosis for 33 week infant 

and estimated length of stay of 3-4 weeks.  As mother was drowsy I did not 

further discuss any problems in depth as she was unable to understand.


If the infant is not delivered will redo the consult this evening or in a.m.


Mother would like to breast-feed the infant and I encouraged her to pump 

breastmilk after the infant is born.





Thank you for the consultation.











CALE SAENZ MD 2017 14:53

## 2017-07-05 NOTE — RADRPT
PROCEDURE:   US OB biophysical profile. 

 

CLINICAL INDICATION:    decreased fetal movements,  labor 

 

TECHNIQUE:   Multiple sonographic images of the pelvis were obtained.  The images were reviewed on a
 PACS workstation. 

 

COMPARISON:   Yesterday 

 

FINDINGS:

 

There is a single viable intrauterine gestation.  Cardiac activity is present with 144 beats per min
[place]. 

There is a vertex presentation. 

The placenta is posterior right.   There is no evidence of placental abruption.

There is a normal amount of amniotic fluid with an AMITA = 10.9 cm.

 

Biophysical profile:

Fetal movement 2/2

Fetal tone 2/2.

Fetal breathing 2/2 

AMITA 2/2

 

Total  

 

RPTAT: AA . 

 

IMPRESSION:

Normal biophysical profile.  

 .

_____________________________________________ 

.Azael Valentino MD, MD           Date    Time 

Electronically viewed and signed by .Azael Valentino MD, MD on 2017 10:13 

 

D:  2017 10:13  T:  2017 10:13

.S/

## 2017-07-05 NOTE — QN
Documentation


Comment


I spoke with Dr. Jennyfer Zee ( GI) specialist:  He does not believe the 

elevated Liver Enzymes may be due to Sludge in Gull bladder.





After Discussing patient continue elevated liver enzyme and worsening patient 

symptom. Dr. Almonte recommending for Delivery by CD.





I explained to patient Risk/benefits/alternative.





Patient understands her epigastric pain may also be related to other etiology.





All question were answered.











JENNYFER FRANCIS MD Jul 5, 2017 20:19

## 2017-07-05 NOTE — QN
Documentation


Comment


Patient seen and evaluated


complaining of epigastric pain


systolic bp's 140'- 150's


abd positive ruq/ epigastric tenderness gravid


extremity plus 1 b/l pitting edema


labs elevated liver enzymes





a/ iup at 33 wks 2 wks ga, suspected pre-eclampsia with severe features


already received 2 doses of steroid in her last admission





p/ start magso4


repeat PIH labs


24 hour urine collection


perinatology f/u


nicu consult


repeat bpp, [place],











JENNYFER FRANCIS MD Jul 5, 2017 08:22

## 2017-07-06 VITALS — HEART RATE: 77 BPM | SYSTOLIC BLOOD PRESSURE: 139 MMHG | RESPIRATION RATE: 17 BRPM | DIASTOLIC BLOOD PRESSURE: 70 MMHG

## 2017-07-06 VITALS — SYSTOLIC BLOOD PRESSURE: 135 MMHG | RESPIRATION RATE: 17 BRPM | DIASTOLIC BLOOD PRESSURE: 70 MMHG | HEART RATE: 67 BPM

## 2017-07-06 VITALS — SYSTOLIC BLOOD PRESSURE: 136 MMHG | DIASTOLIC BLOOD PRESSURE: 68 MMHG | HEART RATE: 65 BPM | RESPIRATION RATE: 20 BRPM

## 2017-07-06 VITALS — HEART RATE: 69 BPM | DIASTOLIC BLOOD PRESSURE: 65 MMHG | SYSTOLIC BLOOD PRESSURE: 128 MMHG

## 2017-07-06 VITALS — RESPIRATION RATE: 18 BRPM | HEART RATE: 83 BPM | SYSTOLIC BLOOD PRESSURE: 123 MMHG | DIASTOLIC BLOOD PRESSURE: 63 MMHG

## 2017-07-06 VITALS — HEART RATE: 82 BPM | RESPIRATION RATE: 18 BRPM | SYSTOLIC BLOOD PRESSURE: 114 MMHG | DIASTOLIC BLOOD PRESSURE: 63 MMHG

## 2017-07-06 VITALS — RESPIRATION RATE: 16 BRPM | HEART RATE: 72 BPM | DIASTOLIC BLOOD PRESSURE: 74 MMHG | SYSTOLIC BLOOD PRESSURE: 137 MMHG

## 2017-07-06 VITALS — DIASTOLIC BLOOD PRESSURE: 71 MMHG | HEART RATE: 73 BPM | SYSTOLIC BLOOD PRESSURE: 130 MMHG | RESPIRATION RATE: 16 BRPM

## 2017-07-06 VITALS — RESPIRATION RATE: 18 BRPM | SYSTOLIC BLOOD PRESSURE: 111 MMHG | DIASTOLIC BLOOD PRESSURE: 62 MMHG | HEART RATE: 80 BPM

## 2017-07-06 VITALS — RESPIRATION RATE: 18 BRPM | DIASTOLIC BLOOD PRESSURE: 66 MMHG | HEART RATE: 86 BPM | SYSTOLIC BLOOD PRESSURE: 120 MMHG

## 2017-07-06 VITALS — SYSTOLIC BLOOD PRESSURE: 128 MMHG | HEART RATE: 63 BPM | DIASTOLIC BLOOD PRESSURE: 69 MMHG

## 2017-07-06 VITALS — RESPIRATION RATE: 18 BRPM | DIASTOLIC BLOOD PRESSURE: 63 MMHG | HEART RATE: 80 BPM | SYSTOLIC BLOOD PRESSURE: 123 MMHG

## 2017-07-06 VITALS — RESPIRATION RATE: 16 BRPM | SYSTOLIC BLOOD PRESSURE: 140 MMHG | HEART RATE: 63 BPM | DIASTOLIC BLOOD PRESSURE: 75 MMHG

## 2017-07-06 VITALS — RESPIRATION RATE: 17 BRPM | HEART RATE: 69 BPM | DIASTOLIC BLOOD PRESSURE: 68 MMHG | SYSTOLIC BLOOD PRESSURE: 135 MMHG

## 2017-07-06 VITALS — HEART RATE: 66 BPM | DIASTOLIC BLOOD PRESSURE: 86 MMHG | RESPIRATION RATE: 20 BRPM | SYSTOLIC BLOOD PRESSURE: 143 MMHG

## 2017-07-06 VITALS — HEART RATE: 72 BPM | DIASTOLIC BLOOD PRESSURE: 68 MMHG | RESPIRATION RATE: 16 BRPM | SYSTOLIC BLOOD PRESSURE: 131 MMHG

## 2017-07-06 VITALS — RESPIRATION RATE: 17 BRPM | HEART RATE: 60 BPM | DIASTOLIC BLOOD PRESSURE: 67 MMHG | SYSTOLIC BLOOD PRESSURE: 121 MMHG

## 2017-07-06 VITALS — DIASTOLIC BLOOD PRESSURE: 67 MMHG | RESPIRATION RATE: 18 BRPM | HEART RATE: 85 BPM | SYSTOLIC BLOOD PRESSURE: 116 MMHG

## 2017-07-06 VITALS — DIASTOLIC BLOOD PRESSURE: 68 MMHG | SYSTOLIC BLOOD PRESSURE: 125 MMHG | HEART RATE: 77 BPM

## 2017-07-06 LAB
ADD SCAN DIFF: NO
ADD SCAN DIFF: NO
ALBUMIN SERPL-MCNC: 3 G/DL (ref 3.3–4.9)
ALBUMIN SERPL-MCNC: 3.1 G/DL (ref 3.3–4.9)
ALBUMIN SERPL-MCNC: 3.1 G/DL (ref 3.3–4.9)
ALBUMIN SERPL-MCNC: 3.2 G/DL (ref 3.3–4.9)
ALBUMIN SERPL-MCNC: 3.3 G/DL (ref 3.3–4.9)
ALBUMIN SERPL-MCNC: 3.4 G/DL (ref 3.3–4.9)
ALBUMIN/GLOB SERPL: 1.06 {RATIO}
ALBUMIN/GLOB SERPL: 1.21 {RATIO}
ALBUMIN/GLOB SERPL: 1.25 {RATIO}
ALP SERPL-CCNC: 163 IU/L (ref 42–121)
ALP SERPL-CCNC: 164 IU/L (ref 42–121)
ALP SERPL-CCNC: 168 IU/L (ref 42–121)
ALP SERPL-CCNC: 168 IU/L (ref 42–121)
ALP SERPL-CCNC: 175 IU/L (ref 42–121)
ALP SERPL-CCNC: 176 IU/L (ref 42–121)
ALP SERPL-CCNC: 195 IU/L (ref 42–121)
ALT SERPL-CCNC: 140 IU/L (ref 13–69)
ALT SERPL-CCNC: 142 IU/L (ref 13–69)
ALT SERPL-CCNC: 148 IU/L (ref 13–69)
ALT SERPL-CCNC: 149 IU/L (ref 13–69)
ALT SERPL-CCNC: 151 IU/L (ref 13–69)
ALT SERPL-CCNC: 152 IU/L (ref 13–69)
ALT SERPL-CCNC: 180 IU/L (ref 13–69)
ANION GAP SERPL CALC-SCNC: 12 MMOL/L (ref 8–16)
ANION GAP SERPL CALC-SCNC: 12 MMOL/L (ref 8–16)
ANION GAP SERPL CALC-SCNC: 15 MMOL/L (ref 8–16)
ANION GAP SERPL CALC-SCNC: 18 MMOL/L (ref 8–16)
AST SERPL-CCNC: 208 IU/L (ref 15–46)
AST SERPL-CCNC: 211 IU/L (ref 15–46)
AST SERPL-CCNC: 225 IU/L (ref 15–46)
AST SERPL-CCNC: 227 IU/L (ref 15–46)
AST SERPL-CCNC: 237 IU/L (ref 15–46)
AST SERPL-CCNC: 241 IU/L (ref 15–46)
AST SERPL-CCNC: 297 IU/L (ref 15–46)
BASOPHILS # BLD AUTO: 0 10^3/UL (ref 0–0.1)
BASOPHILS # BLD AUTO: 0 10^3/UL (ref 0–0.1)
BASOPHILS NFR BLD: 0.1 % (ref 0–2)
BASOPHILS NFR BLD: 0.1 % (ref 0–2)
BILIRUB DIRECT SERPL-MCNC: 0 MG/DL (ref 0–0.2)
BILIRUB SERPL-MCNC: 0 MG/DL (ref 0.2–1.3)
BILIRUB SERPL-MCNC: 0.1 MG/DL (ref 0.2–1.3)
BILIRUB SERPL-MCNC: 0.2 MG/DL (ref 0.2–1.3)
BILIRUB SERPL-MCNC: 0.2 MG/DL (ref 0.2–1.3)
BILIRUB SERPL-MCNC: 0.3 MG/DL (ref 0.2–1.3)
BUN SERPL-MCNC: 5 MG/DL (ref 7–20)
CALCIUM SERPL-MCNC: 6.5 MG/DL (ref 8.4–10.2)
CALCIUM SERPL-MCNC: 6.6 MG/DL (ref 8.4–10.2)
CALCIUM SERPL-MCNC: 6.6 MG/DL (ref 8.4–10.2)
CALCIUM SERPL-MCNC: 7.4 MG/DL (ref 8.4–10.2)
CHLORIDE SERPL-SCNC: 93 MMOL/L (ref 97–110)
CHLORIDE SERPL-SCNC: 94 MMOL/L (ref 97–110)
CHLORIDE SERPL-SCNC: 94 MMOL/L (ref 97–110)
CHLORIDE SERPL-SCNC: 96 MMOL/L (ref 97–110)
CO2 SERPL-SCNC: 23 MMOL/L (ref 21–31)
CO2 SERPL-SCNC: 26 MMOL/L (ref 21–31)
CO2 SERPL-SCNC: 26 MMOL/L (ref 21–31)
CO2 SERPL-SCNC: 30 MMOL/L (ref 21–31)
COLLECTION PERIOD: 24 HRS
CREAT SERPL-MCNC: 0.51 MG/DL (ref 0.44–1)
CREAT SERPL-MCNC: 0.55 MG/DL (ref 0.44–1)
CREAT SERPL-MCNC: 0.61 MG/DL (ref 0.44–1)
CREAT SERPL-MCNC: 0.63 MG/DL (ref 0.44–1)
DEPRECATED HBV CORE AB SER IA-ACNC: NEGATIVE
EOSINOPHIL # BLD: 0 10^3/UL (ref 0–0.5)
EOSINOPHIL # BLD: 0 10^3/UL (ref 0–0.5)
EOSINOPHIL NFR BLD: 0.1 % (ref 0–7)
EOSINOPHIL NFR BLD: 0.1 % (ref 0–7)
ERYTHROCYTE [DISTWIDTH] IN BLOOD BY AUTOMATED COUNT: 14.6 % (ref 11.5–14.5)
ERYTHROCYTE [DISTWIDTH] IN BLOOD BY AUTOMATED COUNT: 14.7 % (ref 11.5–14.5)
FIBRINOGEN PPP-MCNC: 616 MG/DL (ref 207–461)
FSP PPP-MCNC: <10 UG/ML (ref ?–10)
GLOBULIN SER-MCNC: 2.4 G/DL (ref 1.3–3.2)
GLOBULIN SER-MCNC: 2.8 G/DL (ref 1.3–3.2)
GLOBULIN SER-MCNC: 2.9 G/DL (ref 1.3–3.2)
GLUCOSE SERPL-MCNC: 100 MG/DL (ref 70–220)
GLUCOSE SERPL-MCNC: 103 MG/DL (ref 70–220)
GLUCOSE SERPL-MCNC: 108 MG/DL (ref 70–220)
GLUCOSE SERPL-MCNC: 89 MG/DL (ref 70–220)
HAAIG REFLEX: (no result)
HCT VFR BLD CALC: 35.2 % (ref 37–47)
HCT VFR BLD CALC: 36.1 % (ref 37–47)
HGB BLD-MCNC: 12 G/DL (ref 12–16)
HGB BLD-MCNC: 12.4 G/DL (ref 12–16)
LDH SERPL-CCNC: 1330 IU/L (ref 313–618)
LYMPHOCYTES # BLD AUTO: 1.6 10^3/UL (ref 0.8–2.9)
LYMPHOCYTES # BLD AUTO: 2.1 10^3/UL (ref 0.8–2.9)
LYMPHOCYTES NFR BLD AUTO: 12.3 % (ref 15–51)
LYMPHOCYTES NFR BLD AUTO: 8.8 % (ref 15–51)
MCH RBC QN AUTO: 30.9 PG (ref 29–33)
MCH RBC QN AUTO: 31.1 PG (ref 29–33)
MCHC RBC AUTO-ENTMCNC: 34.1 G/DL (ref 32–37)
MCHC RBC AUTO-ENTMCNC: 34.3 G/DL (ref 32–37)
MCV RBC AUTO: 90.5 FL (ref 82–101)
MCV RBC AUTO: 90.7 FL (ref 82–101)
MONOCYTES # BLD: 1 10^3/UL (ref 0.3–0.9)
MONOCYTES # BLD: 1.2 10^3/UL (ref 0.3–0.9)
MONOCYTES NFR BLD: 5.6 % (ref 0–11)
MONOCYTES NFR BLD: 7 % (ref 0–11)
NEUTROPHILS # BLD: 13.6 10^3/UL (ref 1.6–7.5)
NEUTROPHILS # BLD: 15.5 10^3/UL (ref 1.6–7.5)
NEUTROPHILS NFR BLD AUTO: 79.4 % (ref 39–77)
NEUTROPHILS NFR BLD AUTO: 84.9 % (ref 39–77)
NRBC # BLD MANUAL: 0 10^3/UL (ref 0–0)
NRBC # BLD MANUAL: 0 10^3/UL (ref 0–0)
NRBC BLD QL: 0 /100WBC (ref 0–0)
NRBC BLD QL: 0 /100WBC (ref 0–0)
PHOSPHATE SERPL-MCNC: 3.6 MG/DL (ref 2.5–4.9)
PLATELET # BLD: 204 10^3/UL (ref 140–415)
PLATELET # BLD: 216 10^3/UL (ref 140–415)
PMV BLD AUTO: 11.4 FL (ref 7.4–10.4)
PMV BLD AUTO: 11.7 FL (ref 7.4–10.4)
POTASSIUM SERPL-SCNC: 3.8 MMOL/L (ref 3.5–5.1)
POTASSIUM SERPL-SCNC: 4.4 MMOL/L (ref 3.5–5.1)
POTASSIUM SERPL-SCNC: 4.4 MMOL/L (ref 3.5–5.1)
POTASSIUM SERPL-SCNC: 4.5 MMOL/L (ref 3.5–5.1)
PROT SERPL-MCNC: 5.4 G/DL (ref 6.1–8.1)
PROT SERPL-MCNC: 5.9 G/DL (ref 6.1–8.1)
PROT SERPL-MCNC: 6 G/DL (ref 6.1–8.1)
PROT SERPL-MCNC: 6.2 G/DL (ref 6.1–8.1)
PROT SERPL-MCNC: 6.2 G/DL (ref 6.1–8.1)
PROT UR STRIP-MCNC: 31 MG/DL
RBC # BLD AUTO: 3.88 10^6/UL (ref 4.2–5.4)
RBC # BLD AUTO: 3.99 10^6/UL (ref 4.2–5.4)
SODIUM SERPL-SCNC: 129 MMOL/L (ref 135–144)
SODIUM SERPL-SCNC: 130 MMOL/L (ref 135–144)
SODIUM SERPL-SCNC: 131 MMOL/L (ref 135–144)
SODIUM SERPL-SCNC: 132 MMOL/L (ref 135–144)
WBC # BLD AUTO: 17.1 10^3/UL (ref 4.8–10.8)
WBC # BLD AUTO: 18.2 10^3/UL (ref 4.8–10.8)

## 2017-07-06 RX ADMIN — FOLIC ACID SCH MLS/HR: 5 INJECTION, SOLUTION INTRAMUSCULAR; INTRAVENOUS; SUBCUTANEOUS at 08:11

## 2017-07-06 RX ADMIN — MORPHINE SULFATE PRN MG: 2 INJECTION, SOLUTION INTRAMUSCULAR; INTRAVENOUS at 02:33

## 2017-07-06 RX ADMIN — MORPHINE SULFATE PRN MG: 2 INJECTION, SOLUTION INTRAMUSCULAR; INTRAVENOUS at 05:44

## 2017-07-06 RX ADMIN — MAGNESIUM SULFATE IN WATER SCH MLS/HR: 40 INJECTION, SOLUTION INTRAVENOUS at 06:30

## 2017-07-06 RX ADMIN — FOLIC ACID SCH MLS/HR: 5 INJECTION, SOLUTION INTRAMUSCULAR; INTRAVENOUS; SUBCUTANEOUS at 19:46

## 2017-07-06 RX ADMIN — DIPHENHYDRAMINE HYDROCHLORIDE PRN MG: 50 INJECTION, SOLUTION INTRAMUSCULAR; INTRAVENOUS at 20:35

## 2017-07-06 RX ADMIN — MAGNESIUM SULFATE IN WATER SCH MLS/HR: 40 INJECTION, SOLUTION INTRAVENOUS at 19:50

## 2017-07-06 RX ADMIN — FOLIC ACID SCH MLS/HR: 5 INJECTION, SOLUTION INTRAMUSCULAR; INTRAVENOUS; SUBCUTANEOUS at 19:40

## 2017-07-06 RX ADMIN — DOCUSATE SODIUM AND SENNOSIDES SCH TAB: 8.6; 5 TABLET, FILM COATED ORAL at 20:47

## 2017-07-06 RX ADMIN — FOLIC ACID SCH MLS/HR: 5 INJECTION, SOLUTION INTRAMUSCULAR; INTRAVENOUS; SUBCUTANEOUS at 16:00

## 2017-07-06 RX ADMIN — DOCUSATE SODIUM AND SENNOSIDES SCH TAB: 8.6; 5 TABLET, FILM COATED ORAL at 09:00

## 2017-07-06 NOTE — QN
Documentation


Comment


patient seen and evaluated


no complaints


reports improved of pain since surgery


vs stable


ab soft nt no distention


extremity plus 1 b/l pitting edema





labs liver enzymes elevated





a/ sp cd POD 1 currently on mg secondary to preeclampsia





p/ f/u with medicine and gi











JENNYFER FRANCIS MD Jul 6, 2017 13:38

## 2017-07-06 NOTE — CONS
Date/Time of Note


Date/Time of Note


DATE: 17 


TIME: 13:22





Assessment/Plan


Assessment/Plan


Chief Complaint/Hosp Course


A/P: 31 F with elevated LFT's, s/p C sec POD # 1





1. abd pain/elevated LFT's - unclear source. Per surgery input 2 days ago: 

Unclear etiology of abdominal pain. Appendicitis is very unlikely based on MRI 

findings as well as the fact the symptoms have been going on for 2 week. Doubt 

gallbladder as well as patient is no evidence of cholecystitis on ultrasound. Pt

's bilirubin's are nL. Hep panel is nL. DDx =  preecclampsia (apparently per 

nursing pt had elevated bp prior to admission, bp nL now, and + proteinuria), 

EtOH liver ds, although less likely


   - will mainly defer to our GI colleagues about reason for elevated LFT's, 

including their w/u.


    - Will check EToH level just to r/o alcohol as cause.





2. s/p C section - post delivery care per primary OB/GYN team.





Will continue to follow.


Problems:  





Consultation Date/Type/Reason


Admit Date/Time


2017


High risk hospital consult


Reason for Consultation


elevated LFT's





Hx of Present Illness


The patient is a 32-year-old female who was 32 weeks pregnant on admission, now 

s/p  POD # 1. She has been complaining of a two-week history of 

abdominal pain, mostly localized to right upper quadrant as well as right flank 

back pain.  Presently she denies nausea (although she had some this AM) or 

vomiting.  She denies fevers, chills or night sweats.  She was hospitalized at 

[facility] for 4 days and was discharged home recently. Hospitalist team 

asked for consult about continued elevated LFT's (although GI consult also 

obtained, which is pending).


Constitutional:  No chills, No diaphoresis, No disoriented, No febrile, No 

improved, No no complaints, No other, No poor po, No requiring IVF, No 

requiring O2


Psychological:  no complaints





Past Medical History


Medical History:  no pertinent history





Past Surgical History


Past Surgical Hx:  no surgical history





Social History


Alcohol Use:  none


Smoking Status:  Never smoker


Drug Use:  none





Exam/Review of Systems


Vital Signs


Vitals





 Vital Signs








  Date Time  Temp Pulse Resp B/P Pulse Ox O2 Delivery O2 Flow Rate FiO2


 


17 10:15  73 16 130/71  Room Air  


 


17 08:15 98.0       


 


17 04:33     95   21














 Intake and Output   


 


 17





 15:00 23:00 07:00


 


Intake Total 858 ml 1113 ml 915 ml


 


Output Total 500 ml 850 ml 850 ml


 


Balance 358 ml 263 ml 65 ml











Exam


Gen: lying in bed, NAD


HEENT - PERRL, EOMI


Neck - supple


Res - CTA b/l


CV - S1S2 heard, no rubs


Abd: ND, CDI, no R or G


M/S: no LE edema


Neuro: no focal deficits





Results


Result Diagram:  


17 1150                                                                    

            17 1150





Results 24 hrs





Laboratory Tests








Test


  17


18:25 17


00:30 17


07:16 17


09:15


 


Magnesium Level 5.6  *H 6.9  *H 6.0  *H 


 


Total Bilirubin 0.3   0.3   0.2   


 


Direct Bilirubin 0.00   0.00   0.00   


 


Indirect Bilirubin 0.3   0.3   0.2   


 


Aspartate Amino Transf


(AST/SGOT) 266  H


  297  H


  237  H


  


 


 


Alanine Aminotransferase


(ALT/SGPT) 161  H


  180  H


  149  H


  


 


 


Alkaline Phosphatase 185  H 195  H 176  H 


 


Total Protein 5.9  L 6.0  L 6.2   


 


Albumin 3.4   3.3   3.4   


 


Fibrinogen  616.0  H  


 


Plasma Fibrin Degradation


Products 


  <10  


  


  


 


 


Sodium Level  130  L 129  L 


 


Potassium Level  4.4   4.5   


 


Chloride Level  93  L 96  L 


 


Carbon Dioxide Level  23   26   


 


Anion Gap  18  H 12   


 


Blood Urea Nitrogen  5  L 5  L 


 


Creatinine  0.61   0.63   


 


Glucose Level  100   108   


 


Calcium Level  7.4  L 6.5  L 


 


Phosphorus Level  3.6    


 


Lactate Dehydrogenase  1330  H  


 


White Blood Count   18.2  #H 


 


Red Blood Count   3.99  L 


 


Hemoglobin   12.4   


 


Hematocrit   36.1  L 


 


Mean Corpuscular Volume   90.5   


 


Mean Corpuscular Hemoglobin   31.1   


 


Mean Corpuscular Hemoglobin


Concent 


  


  34.3  


  


 


 


Red Cell Distribution Width   14.6  H 


 


Platelet Count   216   


 


Mean Platelet Volume   11.7  H 


 


Neutrophils %   84.9  H 


 


Lymphocytes %   8.8  L 


 


Monocytes %   5.6   


 


Eosinophils %   0.1   


 


Basophils %   0.1   


 


Nucleated Red Blood Cells %   0.0   


 


Neutrophils #   15.5  H 


 


Lymphocytes #   1.6   


 


Monocytes #   1.0  H 


 


Eosinophils #   0.0   


 


Basophils #   0.0   


 


Nucleated Red Blood Cells #   0.0   


 


Globulin   2.80   


 


Albumin/Globulin Ratio   1.21   


 


Hepatitis B Surface Antigen   NEGATIVE   


 


Hepatitis B Core Total


Antibody 


  


  NEGATIVE  


  


 


 


Hepatitis C Antibody   NEGATIVE   


 


Urine Collection Duration    24  


 


Urine Total Volume (Protein)    3500  


 


Urine Total Protein 24 Hour      














Test


  17


11:50 


  


  


 


 


White Blood Count 17.1  H   


 


Red Blood Count 3.88  L   


 


Hemoglobin 12.0     


 


Hematocrit 35.2  L   


 


Mean Corpuscular Volume 90.7     


 


Mean Corpuscular Hemoglobin 30.9     


 


Mean Corpuscular Hemoglobin


Concent 34.1  


  


  


  


 


 


Red Cell Distribution Width 14.7  H   


 


Platelet Count 204     


 


Mean Platelet Volume 11.4  H   


 


Neutrophils % 79.4  H   


 


Lymphocytes % 12.3  L   


 


Monocytes % 7.0     


 


Eosinophils % 0.1     


 


Basophils % 0.1     


 


Nucleated Red Blood Cells % 0.0     


 


Neutrophils # 13.6  H   


 


Lymphocytes # 2.1     


 


Monocytes # 1.2  H   


 


Eosinophils # 0.0     


 


Basophils # 0.0     


 


Nucleated Red Blood Cells # 0.0     


 


Sodium Level 131  L   


 


Potassium Level 4.4     


 


Chloride Level 94  L   


 


Carbon Dioxide Level 26     


 


Anion Gap 15     


 


Blood Urea Nitrogen 5  L   


 


Creatinine 0.55     


 


Glucose Level 89     


 


Calcium Level 6.6  L   


 


Magnesium Level 6.1  *H   


 


Total Bilirubin 0.1  L   


 


Direct Bilirubin 0.00     


 


Indirect Bilirubin 0.1     


 


Aspartate Amino Transf


(AST/SGOT) 225  H


  


  


  


 


 


Alanine Aminotransferase


(ALT/SGPT) 152  H


  


  


  


 


 


Alkaline Phosphatase 168  H   


 


Total Protein 5.4  L   


 


Albumin 3.0  L   


 


Globulin 2.40     


 


Albumin/Globulin Ratio 1.25     











Medications


Medications





 Current Medications


Morphine Sulfate (morphine) 2 mg Q2H  PRN IV PAIN LEVEL 1-5 Last administered 

on  05:44; Admin Dose 2 MG;  Start 17 at 21:00;  Stop 17 at 20:

17


Morphine Sulfate (morphine) 4 mg Q2H  PRN IV PAIN LEVEL 6-10;  Start 17 at 

21:00;  Stop 17 at 20:17


Diphenhydramine HCl (Benadryl) 25 mg Q4H  PRN IV PRURITUS Last administered on  22:43; Admin Dose 25 MG;  Start 17 at 21:00;  Stop 17 at 20:17


Nalbuphine HCl (Nubain) 10 mg Q4H  PRN IV PRURITUS;  Start 17 at 21:00;  

Stop 17 at 20:17


Ondansetron HCl (Zofran Inj) 4 mg Q6H  PRN IV NAUSEA AND/OR VOMITING Last 

administered on  08:10; Admin Dose 4 MG;  Start 17 at 21:00;  Stop  at 20:17


Naloxone HCl (Narcan) 0.2 mg Q2M  PRN IV FOR RESP RATE 8 OR LESS;  Start 17 

at 21:00;  Stop 17 at 20:17


Senna/Docusate Sodium 1 tab 1 tab BID PO ;  Start 17 at 09:00


Oxytocin/Lactated Ringer's 500 ml @ 0 mls/hr ONCE PRN IV For Hemorrhage 

Management;  Start 17 at 00:00


Methylergonovine Maleate (Methergine) 0.2 mg ONCE PRN IM VAGINAL BLEEDING;  

Start 17 at 00:00


Carboprost Tromethamine (Hemabate) 250 mcg ONCE PRN IM VAGINAL BLEEDING;  Start 

17 at 00:00


Misoprostol (Cytotec) 1,000 mcg ONCE PRN NC VAGINAL BLEEDING;  Start 17 at 

00:00


Ketorolac Tromethamine 30 mg 30 mg Q6H  PRN IV PAIN;  Start 17 at 20:18;  

Stop 17 at 20:17


Magnesium Sulfate 500 ml @  37.5 mls/hr [id number] IV ;  Start 17 at 06:30


Sodium Chloride 1,000 ml @  125 mls/hr Q8H IV  Last administered on  08:

11; Admin Dose 125 MLS/HR;  Start 17 at 08:00


Cefazolin Sodium/ Dextrose (Ancef 2 Gm/50 ml (Pmx)) 50 ml @  100 mls/hr Q8 IVPB

  Last administered on  09:15; Admin Dose 100 MLS/HR;  Start 17 at 

09:01











NANCY RUTLEDGE 2017 13:31

## 2017-07-06 NOTE — OPR
Operative Report


Planned Procedure


Free Text/Dictation


DATE OF OPERATION:  2017


 


 


PREOPERATIVE DIAGNOSIS:  Intrauterine pregnancy at 33 weeks gestational age, 

preeclampsia with severe features


 


POSTOPERATIVE DIAGNOSIS:  Same.


 


OPERATION PERFORMED:  Primary low transverse  delivery 


 


SURGEON:  Jennyfer Francis MD.


 


ASSISTANT:  Dr. Hawkins


 


ANESTHESIA:  Spinal.


 


COMPLICATIONS OF PROCEDURE:  None.


 


ESTIMATED BLOOD LOSS:  500 mL.


 


FINDINGS:  A viable male, Apgar 9and 9 respectively at 1 and 5 minutes.  Weight 

1,575 grams .





Pathology: placenta


 


DESCRIPTION OF PROCEDURE:  After explaining the risks, benefits and alternatives

,  the patient and consent signed in chart, the patient was taken to the 

operating room where spinal anesthesia was found to be adequate.  She was then 

prepared and draped in normal sterile fashion in dorsal supine position with a 

leftward tilt.  A Pfannenstiel skin incision was then made with a scalpel and 

carried to the underlying layer of fascia.  The fascia was incised in the 

midline and incision was extended laterally with Beth scissors.  The superior 

aspect of the fascial incision was grasped with curved clamps, elevated and the 

underlying rectus muscles dissected off bluntly.  Attention was then turned to 

the inferior aspect of the incision, which in similar fashion was grasped, 

tented up with curved clamps and the rectus muscles dissected off bluntly.  The 

rectus muscles were then  in midline, peritoneum identified, tented up 

with Metzenbaum scissors.  The peritoneal incision was extended superiorly, 

inferiorly with good visualization of bladder.  .  At this point, the bladder 

blade was then inserted and the vesicouterine peritoneum identified, grasped 

with pickups and entered sharply with Metzenbaum scissors.  This incision was 

extended laterally and the bladder flap created digitally.  The bladder blade 

was then reinserted and was incised in transverse fashion with a scalpel.  The 

uterine incision was extended laterally.  The bladder blade was removed and the 

infant's head delivered atraumatically.  The nose and mouth were suctioned and 

cord clamped and cut.  The infant was handed off to awaiting pediatrician.  The 

placenta was then removed.  The uterus was exteriorized and cleared of all 

clots and debris.  The uterine incision was repaired with 1-0 chromic in a 

running locked fashion.  A second layer of same suture was used for imbrication 

obtaining excellent hemostasis.


 


 The uterus was returned to the abdomen.  The gutters were cleared of all 

clots.  Good hemostasis was assured from the tubal ligation site.  The 

peritoneum was reapproximated with 2-0 plain gut in interrupted fashion.  The 

fascia was reapproximated with 0 Vicryl in a running fashion.  The subcutaneous 

tissue was reapproximated with 2-0 plain gut in a running fashion.  The skin 

was closed with staples.  The patient tolerated procedure well.  Sponge, lap 

and needle counts correct x2.  The patient was taken to recovery room in stable 

condition.


Procedure date


2017





Procedure Description


Under satisfactory [] anesthesia, the patient was prepped and draped and placed 

in a supine position, tilted to the left. Pfannenstiel incision was made, 

carried through the subcutaneous tissue. Bleeders brought under control with 

electrocautery. Fascia incised to the length of the incision. Rectus muscles 

 from the fascia, divided midline. Peritoneum exposed, entered through 

a transverse incision. Exploration of abdomen revealed gravid uterus. Bladder 

flap was developed. Transverse incision was made in the lower segment of the 

uterus. Amniotic sac ruptured. [] amniotic fluid noted. [] Nasal oropharyngeal 

suction was performed.  The baby was handed to the  team for immediate 

attention. The placenta was delivered manually intact. Uterine cavity was 

cleaned with wet sponge and drainage established. Uterus closed in 2 layers 

using [] in continuous fashion. Peritoneal cavity irrigated with warm saline. 

Sponge, needle and instrument count reported to be correct. Abdominal 

peritoneum closed with [] continuously. Rectus muscle approximated with []. 

Fascia closed with [], and skin closed with staples. Estimated blood loss []mL. 

Urine bag contained []mL of urine





Post-Procedure


Findings:


Live Baby [], Apgars [] and [], weight [], position [], [] presentation []cord.





Physician Certification


I, the undersigned physician, hereby certify that I have discussed the 

procedure described in this consent form with this patient (or the patient's 

legal representative), including:


* The risk and benefits of the procedure;


* Any adverse reactions that may reasonably be expected to occur;


* Any alternative efficacious methods of treatment which may be medically viable

;


* The potential problems that may occur during recuperation;


* Potential for blood transfusion and associated risks/benefits; and


* Any research or economic interest I may have regarding this treatment.


I further certify that the patient/legally responsible person was encouraged to 

ask question and that all questions were answered.











JENNYFER FRANCIS MD 6, 2017 18:16

## 2017-07-06 NOTE — CONS
Date/Time of Note


Date/Time of Note


DATE: 17 


TIME: 16:11





Assessment/Plan


Assessment/Plan


Additional Assessment/Plan


Assessment


* S/P  section 2017


* Transaminitis


                   R/O fatty liver in pregnancy vs preeclampsia vs infectious 

vs others


Plan


* trend liver enzymes


* continue present management


* request for hepatitis profile


* Case discussed with DR Correa and Dr Nguyen


* Further orders will depend on clinical course





Consultation Date/Type/Reason


Admit Date/Time


2017


High risk hospital consult


Date of Consultation:  2017


Type of Consultation:  gastroenterology


Reason for Consultation


elevated liver enzymes


Referring Provider:  JENNYFER FRANCIS MD





Hx of Present Illness


 31 year old female primigravid female referred to us for evaluation of 

elevated liver enzymes.Present condition apparently after discharge from our 

hospital when she complained of nausea ,vomiting and right sided abdominal pain,

Previous MRI 1.  Nonvisualization of the appendix, but without secondary 

findings to suggest acute appendicitis.  Single intrauterine pregnancy..  Mild 

right hydroureteronephrosis, unchanged when compared the recent abdominal 

ultrasound, given the difference in technique. Laboratory showed elevated 

transaminase but presently trending downward with ,.alkaline 

phosphatase 175 ,WBC 17.1 Hgb 12 platelet 204 PT 11.1 and INR 0.8.Patient had 

csection last night,presently patient denies any right sided abdominal pain nor 

nausea or vomiting


Constitutional:  no complaints, 


   No diaphoresis, No disoriented, No febrile, No other, No poor po, No 

requiring IVF, No requiring O2


Eyes:  no complaints


ENT:  no complaints


Respiratory:  no complaints


Cardiovascular:  no complaints


Gastrointestinal:  pain


Genitourinary:  no complaints


Musculoskeletal:  no complaints


Skin:  no complaints


Neurologic:  no complaints


Endocrine:  no complaints


Lymphatic:  no complaints


Psychological:  no complaints


Immunologic:  no complaints





Past Medical History


Medical History:  no pertinent history





Past Surgical History


Past Surgical Hx:  no surgical history





Social History


Alcohol Use:  none


Smoking Status:  Never smoker


Drug Use:  none





Exam/Review of Systems


Vital Signs


Vitals





 Vital Signs








  Date Time  Temp Pulse Resp B/P Pulse Ox O2 Delivery O2 Flow Rate FiO2


 


17 14:15  60 17 121/67  Room Air  


 


17 12:15 98.1       


 


17 04:33     95   21














 Intake and Output   


 


 17





 15:00 23:00 07:00


 


Intake Total 858 ml 1113 ml 915 ml


 


Output Total 500 ml 850 ml 850 ml


 


Balance 358 ml 263 ml 65 ml











Exam


Constitutional:  alert, oriented


Psych:  nl mood/affect


Head:  atraumatic, normocephalic


Eyes:  nl conjunctiva


ENMT:  nl external ears & nose


Neck:  non-tender, supple


Respiratory:  clear to auscultation, normal air movement


Cardiovascular:  regular rate and rhythm


Gastrointestinal:  distended, non-tender, soft


Musculoskeletal:  nl extremities to inspection, nl gait and stance


Extremities:  normal pulses


Neurological:  nl speech, nl strength


Skin:  nl turgor, 


   No rash or lesions


Lymph:  nl lymph nodes





Results


Result Diagram:  


17 1150                                                                    

            17 1150





Results 24 hrs





Laboratory Tests








Test


  17


18:25 17


00:30 17


07:16 17


09:15


 


Magnesium Level 5.6  *H 6.9  *H 6.0  *H 


 


Total Bilirubin 0.3   0.3   0.2   


 


Direct Bilirubin 0.00   0.00   0.00   


 


Indirect Bilirubin 0.3   0.3   0.2   


 


Aspartate Amino Transf


(AST/SGOT) 266  H


  297  H


  237  H


  


 


 


Alanine Aminotransferase


(ALT/SGPT) 161  H


  180  H


  149  H


  


 


 


Alkaline Phosphatase 185  H 195  H 176  H 


 


Total Protein 5.9  L 6.0  L 6.2   


 


Albumin 3.4   3.3   3.4   


 


Fibrinogen  616.0  H  


 


Plasma Fibrin Degradation


Products 


  <10  


  


  


 


 


Sodium Level  130  L 129  L 


 


Potassium Level  4.4   4.5   


 


Chloride Level  93  L 96  L 


 


Carbon Dioxide Level  23   26   


 


Anion Gap  18  H 12   


 


Blood Urea Nitrogen  5  L 5  L 


 


Creatinine  0.61   0.63   


 


Glucose Level  100   108   


 


Calcium Level  7.4  L 6.5  L 


 


Phosphorus Level  3.6    


 


Lactate Dehydrogenase  1330  H  


 


White Blood Count   18.2  #H 


 


Red Blood Count   3.99  L 


 


Hemoglobin   12.4   


 


Hematocrit   36.1  L 


 


Mean Corpuscular Volume   90.5   


 


Mean Corpuscular Hemoglobin   31.1   


 


Mean Corpuscular Hemoglobin


Concent 


  


  34.3  


  


 


 


Red Cell Distribution Width   14.6  H 


 


Platelet Count   216   


 


Mean Platelet Volume   11.7  H 


 


Neutrophils %   84.9  H 


 


Lymphocytes %   8.8  L 


 


Monocytes %   5.6   


 


Eosinophils %   0.1   


 


Basophils %   0.1   


 


Nucleated Red Blood Cells %   0.0   


 


Neutrophils #   15.5  H 


 


Lymphocytes #   1.6   


 


Monocytes #   1.0  H 


 


Eosinophils #   0.0   


 


Basophils #   0.0   


 


Nucleated Red Blood Cells #   0.0   


 


Globulin   2.80   


 


Albumin/Globulin Ratio   1.21   


 


Hepatitis B Surface Antigen   NEGATIVE   


 


Hepatitis B Core Total


Antibody 


  


  NEGATIVE  


  


 


 


Hepatitis C Antibody   NEGATIVE   


 


Urine Collection Duration    24  


 


Urine Total Volume (Protein)    3500  


 


Urine Total Protein 24 Hour      














Test


  17


11:50 17


14:25 


  


 


 


White Blood Count 17.1  H   


 


Red Blood Count 3.88  L   


 


Hemoglobin 12.0     


 


Hematocrit 35.2  L   


 


Mean Corpuscular Volume 90.7     


 


Mean Corpuscular Hemoglobin 30.9     


 


Mean Corpuscular Hemoglobin


Concent 34.1  


  


  


  


 


 


Red Cell Distribution Width 14.7  H   


 


Platelet Count 204     


 


Mean Platelet Volume 11.4  H   


 


Neutrophils % 79.4  H   


 


Lymphocytes % 12.3  L   


 


Monocytes % 7.0     


 


Eosinophils % 0.1     


 


Basophils % 0.1     


 


Nucleated Red Blood Cells % 0.0     


 


Neutrophils # 13.6  H   


 


Lymphocytes # 2.1     


 


Monocytes # 1.2  H   


 


Eosinophils # 0.0     


 


Basophils # 0.0     


 


Nucleated Red Blood Cells # 0.0     


 


Sodium Level 131  L   


 


Potassium Level 4.4     


 


Chloride Level 94  L   


 


Carbon Dioxide Level 26     


 


Anion Gap 15     


 


Blood Urea Nitrogen 5  L   


 


Creatinine 0.55     


 


Glucose Level 89     


 


Calcium Level 6.6  L   


 


Magnesium Level 6.1  *H   


 


Total Bilirubin 0.1  L   


 


Direct Bilirubin 0.00     


 


Indirect Bilirubin 0.1     


 


Aspartate Amino Transf


(AST/SGOT) 225  H


  


  


  


 


 


Alanine Aminotransferase


(ALT/SGPT) 152  H


  


  


  


 


 


Alkaline Phosphatase 168  H   


 


Total Protein 5.4  L   


 


Albumin 3.0  L   


 


Globulin 2.40     


 


Albumin/Globulin Ratio 1.25     


 


Ethyl Alcohol Level  < 10.0    











Medications


Medications





 Current Medications


Morphine Sulfate (morphine) 2 mg Q2H  PRN IV PAIN LEVEL 1-5 Last administered 

on  05:44; Admin Dose 2 MG;  Start 17 at 21:00;  Stop 17 at 20:

17


Morphine Sulfate (morphine) 4 mg Q2H  PRN IV PAIN LEVEL 6-10;  Start 17 at 

21:00;  Stop 17 at 20:17


Diphenhydramine HCl (Benadryl) 25 mg Q4H  PRN IV PRURITUS Last administered on  22:43; Admin Dose 25 MG;  Start 17 at 21:00;  Stop 17 at 20:17


Nalbuphine HCl (Nubain) 10 mg Q4H  PRN IV PRURITUS;  Start 17 at 21:00;  

Stop 17 at 20:17


Ondansetron HCl (Zofran Inj) 4 mg Q6H  PRN IV NAUSEA AND/OR VOMITING Last 

administered on  08:10; Admin Dose 4 MG;  Start 17 at 21:00;  Stop  at 20:17


Naloxone HCl (Narcan) 0.2 mg Q2M  PRN IV FOR RESP RATE 8 OR LESS;  Start 17 

at 21:00;  Stop 17 at 20:17


Senna/Docusate Sodium 1 tab 1 tab BID PO ;  Start 17 at 09:00


Oxytocin/Lactated Ringer's 500 ml @ 0 mls/hr ONCE PRN IV For Hemorrhage 

Management;  Start 17 at 00:00


Methylergonovine Maleate (Methergine) 0.2 mg ONCE PRN IM VAGINAL BLEEDING;  

Start 17 at 00:00


Carboprost Tromethamine (Hemabate) 250 mcg ONCE PRN IM VAGINAL BLEEDING;  Start 

17 at 00:00


Misoprostol (Cytotec) 1,000 mcg ONCE PRN AL VAGINAL BLEEDING;  Start 17 at 

00:00


Ketorolac Tromethamine 30 mg 30 mg Q6H  PRN IV PAIN;  Start 17 at 20:18;  

Stop 17 at 20:17


Magnesium Sulfate 500 ml @  37.5 mls/hr [id number] IV ;  Start 17 at 06:30


Sodium Chloride 1,000 ml @  125 mls/hr Q8H IV  Last administered on  08:

11; Admin Dose 125 MLS/HR;  Start 17 at 08:00


Cefazolin Sodium/ Dextrose (Ancef 2 Gm/50 ml (Pmx)) 50 ml @  100 mls/hr Q8 IVPB

  Last administered on  09:15; Admin Dose 100 MLS/HR;  Start 17 at 

09:01











ROSE FAIRBANKS NP 2017 16:22

## 2017-07-07 VITALS — SYSTOLIC BLOOD PRESSURE: 117 MMHG | HEART RATE: 76 BPM | RESPIRATION RATE: 20 BRPM | DIASTOLIC BLOOD PRESSURE: 71 MMHG

## 2017-07-07 VITALS — DIASTOLIC BLOOD PRESSURE: 63 MMHG | SYSTOLIC BLOOD PRESSURE: 124 MMHG | HEART RATE: 76 BPM | RESPIRATION RATE: 20 BRPM

## 2017-07-07 VITALS — RESPIRATION RATE: 19 BRPM | DIASTOLIC BLOOD PRESSURE: 70 MMHG | SYSTOLIC BLOOD PRESSURE: 127 MMHG | HEART RATE: 85 BPM

## 2017-07-07 VITALS — DIASTOLIC BLOOD PRESSURE: 50 MMHG | RESPIRATION RATE: 20 BRPM | SYSTOLIC BLOOD PRESSURE: 95 MMHG | HEART RATE: 77 BPM

## 2017-07-07 VITALS — RESPIRATION RATE: 18 BRPM | DIASTOLIC BLOOD PRESSURE: 60 MMHG | HEART RATE: 70 BPM | SYSTOLIC BLOOD PRESSURE: 126 MMHG

## 2017-07-07 VITALS — DIASTOLIC BLOOD PRESSURE: 60 MMHG | HEART RATE: 62 BPM | RESPIRATION RATE: 20 BRPM | SYSTOLIC BLOOD PRESSURE: 126 MMHG

## 2017-07-07 LAB
ADD SCAN DIFF: NO
ALBUMIN SERPL-MCNC: 3 G/DL (ref 3.3–4.9)
ALBUMIN/GLOB SERPL: 1.07 {RATIO}
ALP SERPL-CCNC: 147 IU/L (ref 42–121)
ALP SERPL-CCNC: 153 IU/L (ref 42–121)
ALT SERPL-CCNC: 121 IU/L (ref 13–69)
ALT SERPL-CCNC: 124 IU/L (ref 13–69)
ANION GAP SERPL CALC-SCNC: 14 MMOL/L (ref 8–16)
AST SERPL-CCNC: 164 IU/L (ref 15–46)
AST SERPL-CCNC: 165 IU/L (ref 15–46)
BASOPHILS # BLD AUTO: 0 10^3/UL (ref 0–0.1)
BASOPHILS NFR BLD: 0.2 % (ref 0–2)
BILIRUB DIRECT SERPL-MCNC: 0 MG/DL (ref 0–0.2)
BILIRUB SERPL-MCNC: 0.1 MG/DL (ref 0.2–1.3)
BUN SERPL-MCNC: 5 MG/DL (ref 7–20)
CALCIUM SERPL-MCNC: 7.2 MG/DL (ref 8.4–10.2)
CHLORIDE SERPL-SCNC: 98 MMOL/L (ref 97–110)
CO2 SERPL-SCNC: 29 MMOL/L (ref 21–31)
CREAT SERPL-MCNC: 0.54 MG/DL (ref 0.44–1)
EOSINOPHIL # BLD: 0.1 10^3/UL (ref 0–0.5)
EOSINOPHIL NFR BLD: 0.9 % (ref 0–7)
ERYTHROCYTE [DISTWIDTH] IN BLOOD BY AUTOMATED COUNT: 14.9 % (ref 11.5–14.5)
GLOBULIN SER-MCNC: 2.8 G/DL (ref 1.3–3.2)
GLUCOSE SERPL-MCNC: 80 MG/DL (ref 70–220)
HCT VFR BLD CALC: 34.6 % (ref 37–47)
HGB BLD-MCNC: 11.7 G/DL (ref 12–16)
LYMPHOCYTES # BLD AUTO: 3 10^3/UL (ref 0.8–2.9)
LYMPHOCYTES NFR BLD AUTO: 22.5 % (ref 15–51)
MCH RBC QN AUTO: 30.8 PG (ref 29–33)
MCHC RBC AUTO-ENTMCNC: 33.8 G/DL (ref 32–37)
MCV RBC AUTO: 91.1 FL (ref 82–101)
MONOCYTES # BLD: 0.9 10^3/UL (ref 0.3–0.9)
MONOCYTES NFR BLD: 6.6 % (ref 0–11)
NEUTROPHILS # BLD: 9.2 10^3/UL (ref 1.6–7.5)
NEUTROPHILS NFR BLD AUTO: 69.3 % (ref 39–77)
NRBC # BLD MANUAL: 0 10^3/UL (ref 0–0)
NRBC BLD QL: 0 /100WBC (ref 0–0)
PLATELET # BLD: 202 10^3/UL (ref 140–415)
PMV BLD AUTO: 11.3 FL (ref 7.4–10.4)
POTASSIUM SERPL-SCNC: 3.7 MMOL/L (ref 3.5–5.1)
PROT SERPL-MCNC: 5.8 G/DL (ref 6.1–8.1)
RBC # BLD AUTO: 3.8 10^6/UL (ref 4.2–5.4)
SODIUM SERPL-SCNC: 137 MMOL/L (ref 135–144)
WBC # BLD AUTO: 13.3 10^3/UL (ref 4.8–10.8)

## 2017-07-07 RX ADMIN — DOCUSATE SODIUM AND SENNOSIDES SCH TAB: 8.6; 5 TABLET, FILM COATED ORAL at 11:52

## 2017-07-07 RX ADMIN — IBUPROFEN PRN MG: 600 TABLET ORAL at 03:29

## 2017-07-07 RX ADMIN — IBUPROFEN PRN MG: 600 TABLET ORAL at 23:29

## 2017-07-07 RX ADMIN — DOCUSATE SODIUM AND SENNOSIDES SCH TAB: 8.6; 5 TABLET, FILM COATED ORAL at 21:21

## 2017-07-07 NOTE — RADRPT
PROCEDURE:   CT abdomen and pelvis without IV contrast. 

 

CLINICAL INDICATION:   Abdominal and right flank pain.  Postpartum state.

 

TECHNIQUE:   CT scan of the abdomen and pelvis without contrast was performed on the [employer] volumetric 6
4 slice CT scanner.  The patient was scanned without intravenous contrast. Coronal and sagittal refo
rmatted images were obtained from the axial source images. The CTDI vol is 16.27 mGy and the DLP is 
948.53 mGy-cm.  

 

COMPARISON:   MRI of the abdomen from 2017 

 

FINDINGS:

 

CT abdomen:

The lung bases are clear.  The heart size is not enlarged and is without pericardial thickening or e
ffusion.  

 

The liver is normal in size and density and is without focal mass or intrahepatic biliary dilatation
.  The spleen is normal in size and homogeneous in density.  The stomach is grossly unremarkable.  T
he pancreas as visualized is normal.  The gallbladder is distended with high-density material in the
 gallbladder lumen.  Pericholecystic edema is seen.  The adrenal glands are symmetric and normal.  T
he kidneys are symmetrically unremarkable as well.  No renal calculus or obstructive uropathy or mas
s lesion is seen.

 

The aorta is of normal in caliber.  There is no retroperitoneal lymphadenopathy.  The keily hepatis 
region is clear.  The large bowel is stool-filled. The small and large bowel and mesentery, as visua
lized, are otherwise unremarkable. No inflammatory changes in the periappendiceal region is seen.

 

CT pelvis:

The uterus is enlarged.  Soft tissue air in the anterior abdominal wall is seen including hemorrhage
.  The pelvic sidewalls and inguinal regions are clear.  No pelvic mass, lymphadenopathy, or free fl
uid is seen.  No acute inflammation is seen.  The urinary bladder is within normal limits.

 

The surrounding osseous structures are unremarkable.  No osteolytic or osteoblastic lesion is detect
ed. 

 

IMPRESSION:

 

1.  CT findings consistent with a recent  with acute postsurgical changes as described abov
e.

2.  Distended gallbladder with high-density material which may represent layering gallstones includi
ng pericholecystic edema.  Further evaluation with right upper quadrant ultrasound is recommended.

3.  Enlarged uterus consistent with a recent postpartum state.

 

RPTAT: HPNM

_____________________________________________ 

Dyllan Mckinnon Physician           Date    Time 

Electronically viewed and signed by Dyllan Mckinnon Physician on 2017 18:14 

 

D:  2017 18:14  T:  2017 18:14

PM/

## 2017-07-07 NOTE — QN
Documentation


Comment


patient seen and evaluated


no complaints


reports improved of pain since surgery


vs stable


ab soft nt no distention no epigastric pain dressing clean


extremity edema no calf tenderness





labs pending





a/ sp cd POD 2


stable afebrile





p/ f/u with labs











JENNYFER FRANCIS MD Jul 7, 2017 06:19

## 2017-07-08 VITALS — DIASTOLIC BLOOD PRESSURE: 61 MMHG | SYSTOLIC BLOOD PRESSURE: 127 MMHG | HEART RATE: 77 BPM | RESPIRATION RATE: 18 BRPM

## 2017-07-08 VITALS — DIASTOLIC BLOOD PRESSURE: 65 MMHG | SYSTOLIC BLOOD PRESSURE: 124 MMHG | HEART RATE: 73 BPM | RESPIRATION RATE: 17 BRPM

## 2017-07-08 VITALS — DIASTOLIC BLOOD PRESSURE: 78 MMHG | SYSTOLIC BLOOD PRESSURE: 111 MMHG | RESPIRATION RATE: 17 BRPM | HEART RATE: 77 BPM

## 2017-07-08 VITALS — DIASTOLIC BLOOD PRESSURE: 70 MMHG | SYSTOLIC BLOOD PRESSURE: 121 MMHG | HEART RATE: 76 BPM | RESPIRATION RATE: 20 BRPM

## 2017-07-08 LAB
ADD SCAN DIFF: NO
ALBUMIN SERPL-MCNC: 3.2 G/DL (ref 3.3–4.9)
ALBUMIN/GLOB SERPL: 1.18 {RATIO}
ALP SERPL-CCNC: 127 IU/L (ref 42–121)
ALT SERPL-CCNC: 84 IU/L (ref 13–69)
ANION GAP SERPL CALC-SCNC: 8 MMOL/L (ref 8–16)
AST SERPL-CCNC: 75 IU/L (ref 15–46)
BASOPHILS # BLD AUTO: 0 10^3/UL (ref 0–0.1)
BASOPHILS NFR BLD: 0.3 % (ref 0–2)
BILIRUB DIRECT SERPL-MCNC: 0 MG/DL (ref 0–0.2)
BILIRUB SERPL-MCNC: 0 MG/DL (ref 0.2–1.3)
BUN SERPL-MCNC: 5 MG/DL (ref 7–20)
CALCIUM SERPL-MCNC: 8.5 MG/DL (ref 8.4–10.2)
CHLORIDE SERPL-SCNC: 103 MMOL/L (ref 97–110)
CO2 SERPL-SCNC: 26 MMOL/L (ref 21–31)
CREAT SERPL-MCNC: 0.54 MG/DL (ref 0.44–1)
EOSINOPHIL # BLD: 0.2 10^3/UL (ref 0–0.5)
EOSINOPHIL NFR BLD: 1.3 % (ref 0–7)
ERYTHROCYTE [DISTWIDTH] IN BLOOD BY AUTOMATED COUNT: 15 % (ref 11.5–14.5)
GLOBULIN SER-MCNC: 2.7 G/DL (ref 1.3–3.2)
GLUCOSE SERPL-MCNC: 78 MG/DL (ref 70–220)
HCT VFR BLD CALC: 34.1 % (ref 37–47)
HGB BLD-MCNC: 11.6 G/DL (ref 12–16)
LYMPHOCYTES # BLD AUTO: 3 10^3/UL (ref 0.8–2.9)
LYMPHOCYTES NFR BLD AUTO: 19 % (ref 15–51)
MCH RBC QN AUTO: 31.8 PG (ref 29–33)
MCHC RBC AUTO-ENTMCNC: 34 G/DL (ref 32–37)
MCV RBC AUTO: 93.4 FL (ref 82–101)
MONOCYTES # BLD: 1 10^3/UL (ref 0.3–0.9)
MONOCYTES NFR BLD: 6.2 % (ref 0–11)
NEUTROPHILS # BLD: 11.4 10^3/UL (ref 1.6–7.5)
NEUTROPHILS NFR BLD AUTO: 72.6 % (ref 39–77)
NRBC # BLD MANUAL: 0 10^3/UL (ref 0–0)
NRBC BLD QL: 0 /100WBC (ref 0–0)
PLATELET # BLD: 202 10^3/UL (ref 140–415)
PMV BLD AUTO: 11 FL (ref 7.4–10.4)
POTASSIUM SERPL-SCNC: 4 MMOL/L (ref 3.5–5.1)
PROT SERPL-MCNC: 5.9 G/DL (ref 6.1–8.1)
RBC # BLD AUTO: 3.65 10^6/UL (ref 4.2–5.4)
SODIUM SERPL-SCNC: 133 MMOL/L (ref 135–144)
WBC # BLD AUTO: 15.7 10^3/UL (ref 4.8–10.8)

## 2017-07-08 RX ADMIN — IBUPROFEN PRN MG: 600 TABLET ORAL at 13:43

## 2017-07-08 RX ADMIN — DOCUSATE SODIUM AND SENNOSIDES SCH TAB: 8.6; 5 TABLET, FILM COATED ORAL at 08:47

## 2017-07-08 RX ADMIN — DOCUSATE SODIUM AND SENNOSIDES SCH TAB: 8.6; 5 TABLET, FILM COATED ORAL at 19:45

## 2017-07-08 RX ADMIN — IBUPROFEN PRN MG: 600 TABLET ORAL at 05:28

## 2017-07-08 RX ADMIN — IBUPROFEN PRN MG: 600 TABLET ORAL at 19:45

## 2017-07-08 NOTE — RADRPT
PROCEDURE:   US Abdomen (right upper quadrant). 

 

CLINICAL INDICATION:   Right upper quadrant abdomen pain.  

 

TECHNIQUE:   Multiple real-time longitudinal and transverse images of the right upper quadrant of [facility] abdomen were acquired utilizing a curved array transducer. Images were reviewed on a high-resoluti
on PACS workstation. 

 

COMPARISON:   CT scan of the abdomen and pelvis dated 07/07/2017. 

 

FINDINGS:

The liver is normal in size and normal in echogenicity.

There is no focal hepatic lesion.   Color Doppler and pulsed Doppler sonography demonstrate normal a
ntegrade flow in the portal vein. 

Sludge is present in the gallbladder.  The gallbladder is otherwise normal with no stones or wall th
ickening.  There is no pericholecystic fluid collection.

The bile ducts are normal with the common bile duct measuring 3.5 mm in diameter.  

The visualized portions of the pancreas are unremarkable with obscuration of the tail of the pancrea
s.  

No free fluid is present.  

The right kidney measures 10.8 x 4.1 cm.  There is normal  echogenicity of the right kidney.  There 
is no perinephric fluid collection.  No hydronephrosis, mass, or calculus is seen.   

 

IMPRESSION:

1.  Sludge in the gallbladder.  No gallstones or evidence of cholecystitis.

2.  Otherwise normal right upper quadrant abdomen ultrasound. 

 

RPTAT: QQ

_____________________________________________ 

.Lei Jurado MD, MD           Date    Time 

Electronically viewed and signed by .Lei Juardo MD, MD on 07/08/2017 11:59 

 

D:  07/08/2017 11:59  T:  07/08/2017 11:59

.R/

## 2017-07-08 NOTE — QN
Documentation


Comment


patient seen and evaluated


no complaints


reports improved of pain since surgery


vs stable


ab soft nt no distention no epigastric pain c/d/i no distention


extremity edema no calf tenderness





labs ast/alt decreasing


imaging: sludge in gullblasder





a/ sp cd POD 3


stable afebrile





p/ repeat labs in am


consider d/c home if stable tomorrow











JENNYFER FRANCIS MD Jul 8, 2017 15:42

## 2017-07-08 NOTE — CONS
Date/Time of Note


Date/Time of Note


DATE: 7/8/17 


TIME: 17:49





Consult Date/Type/Reason


Admit Date/Time


Jul 3, 2017 at 23:45


Initial Consult Date


7/6/17


Type of Consultation:  gastroenterology


Ordering Provider:  JENNYFER FRANCIS MD





Subjective


Pt had GB U/S performed, per nursing staff, has less abd pain now. CT A/P 

performed yesterday as well.





Objective





 Vital Signs








  Date Time  Temp Pulse Resp B/P Pulse Ox O2 Delivery O2 Flow Rate FiO2


 


7/8/17 15:57 97.9 73 17 124/65  Room Air  


 


7/7/17 16:56     98   21














 Intake and Output   


 


 7/7/17 7/7/17 7/8/17





 15:00 23:00 07:00


 


Intake Total 50 ml 50 ml 50 ml


 


Balance 50 ml 50 ml 50 ml








Exam


Gen: lying in bed, NAD


HEENT - PERRL, EOMI


Neck - supple


Res - CTA b/l


CV - S1S2 heard, no rubs


Abd: ND, positive right upper quadrant tenderness, no R or G


M/S: no LE edema


Neuro: no focal deficits





Results/Medications


Result Diagram:  


7/8/17 0725 7/8/17 0725





Results 24 hrs





Laboratory Tests








Test


  7/8/17


07:25


 


White Blood Count 15.7  H


 


Red Blood Count 3.65  L


 


Hemoglobin 11.6  L


 


Hematocrit 34.1  L


 


Mean Corpuscular Volume 93.4  


 


Mean Corpuscular Hemoglobin 31.8  


 


Mean Corpuscular Hemoglobin


Concent 34.0  


 


 


Red Cell Distribution Width 15.0  H


 


Platelet Count 202  


 


Mean Platelet Volume 11.0  H


 


Neutrophils % 72.6  


 


Lymphocytes % 19.0  


 


Monocytes % 6.2  


 


Eosinophils % 1.3  


 


Basophils % 0.3  


 


Nucleated Red Blood Cells % 0.0  


 


Neutrophils # 11.4  H


 


Lymphocytes # 3.0  H


 


Monocytes # 1.0  H


 


Eosinophils # 0.2  


 


Basophils # 0.0  


 


Nucleated Red Blood Cells # 0.0  


 


Sodium Level 133  L


 


Potassium Level 4.0  


 


Chloride Level 103  


 


Carbon Dioxide Level 26  


 


Anion Gap 8  


 


Blood Urea Nitrogen 5  L


 


Creatinine 0.54  


 


Glucose Level 78  


 


Calcium Level 8.5  


 


Total Bilirubin 0.0  L


 


Direct Bilirubin 0.00  


 


Indirect Bilirubin 0.0  


 


Aspartate Amino Transf


(AST/SGOT) 75  H


 


 


Alanine Aminotransferase


(ALT/SGPT) 84  H


 


 


Alkaline Phosphatase 127  H


 


Total Protein 5.9  L


 


Albumin 3.2  L


 


Globulin 2.70  


 


Albumin/Globulin Ratio 1.18  








Medications





 Current Medications


Senna/Docusate Sodium 1 tab 1 tab BID PO  Last administered on 7/7/17at 21:21; 

Admin Dose 1 TAB;  Start 7/6/17 at 09:00


Oxytocin/Lactated Ringer's 500 ml @ 0 mls/hr ONCE PRN IV For Hemorrhage 

Management;  Start 7/6/17 at 00:00


Methylergonovine Maleate (Methergine) 0.2 mg ONCE PRN IM VAGINAL BLEEDING;  

Start 7/6/17 at 00:00


Carboprost Tromethamine (Hemabate) 250 mcg ONCE PRN IM VAGINAL BLEEDING;  Start 

7/6/17 at 00:00


Misoprostol (Cytotec) 1,000 mcg ONCE PRN NE VAGINAL BLEEDING;  Start 7/6/17 at 

00:00


Ketorolac Tromethamine (Toradol) 30 mg Q6H  PRN IV PAIN Last administered on 7/6 /17at 16:14; Admin Dose 30 MG;  Start 7/6/17 at 16:11;  Stop 7/9/17 at 16:10


Simethicone (Mylicon) 160 mg Q8  PRN PO DISTENSION/GAS/BLOATING Last 

administered on 7/6/17at 17:21; Admin Dose 160 MG;  Start 7/6/17 at 16:30


Oxycodone/ Acetaminophen (Percocet (5/ 325)) 2 tab Q4H  PRN PO PAIN Last 

administered on 7/8/17at 16:36; Admin Dose 2 TAB;  Start 7/6/17 at 16:30


Ibuprofen (Motrin) 600 mg Q6H  PRN PO BREAKTHROUGH PAIN Last administered on 7/8 /17at 13:43; Admin Dose 600 MG;  Start 7/6/17 at 16:30





Assessment/Plan


Chief Complaint/Hosp Course


A/P: 31 F with elevated LFT's, s/p C sec POD # 2





1. abd pain/elevated LFT's - unclear source. Per surgery input 4 days ago: 

Unclear etiology of abdominal pain. Appendicitis is very unlikely based on MRI 

findings as well as the fact the symptoms have been going on for 2 week. Doubt 

gallbladder as well as patient is no evidence of cholecystitis on ultrasound 

both a few days ago, as well as today's U/S. Pt's bilirubin's are nL. Hep panel 

is nL. DDx =  preecclampsia (apparently per nursing pt had elevated bp prior to 

admission, bp nL now, and + proteinuria), EtOH liver ds, although less likely.  

Ethanol screen was normal. Interestingly, patient volunteered to nursing staff 

this morning that she has had history of kidney stones in the past -but CT A/P 

was neg for any kidney or gall stones. LFT's are trending down as well.


   - will mainly defer to our GI colleagues about reason for elevated LFT's, 

including their w/u.





2. s/p C section - post delivery care per primary OB/GYN team.





Will sign off for now, reconsult if any further issues, thanks.


Problems:  











NANCY RUTLEDGE. Jul 8, 2017 17:52

## 2017-07-08 NOTE — PN
Date/Time of Note


Date/Time of Note


DATE: 17 


TIME: 12:08





Assessment/Plan


VTE Prophylaxis


VTE Prophylaxis Intervention:  ambulation





Lines/Catheters


IV Catheter Type (from Nrs):  Peripheral IV





Assessment/Plan


Chief Complaint/Hosp Course





Problems:  


Assessment/Plan





Assessment


* S/P  section 2017


* Transaminitis improving


* Gallbladder sludge by ultrasound


                   


Plan





* Stable for outpatient management


* Follow up in 1 month at [facility] in [place]


* Case discussed with DR Correa





Subjective


24 Hr Interval Summary


Free Text/Dictation


* Course reviewed with RN


* Patient seen and examined


* Ultrasound of abdomen


                      .  Sludge in the gallbladder.  No gallstones or evidence 

of cholecystitis.


                         Otherwise normal right upper quadrant abdomen 

ultrasound. 


* transaminase levels improving





Exam/Review of Systems


Vital Signs


Vitals





 Vital Signs








  Date Time  Temp Pulse Resp B/P Pulse Ox O2 Delivery O2 Flow Rate FiO2


 


17 08:31 98.6 77 18 127/61  Room Air  


 


17 16:56     98   21














 Intake and Output   


 


 17





 15:00 23:00 07:00


 


Intake Total 50 ml 50 ml 50 ml


 


Balance 50 ml 50 ml 50 ml











Exam


Constitutional:  alert, oriented


Head:  normocephalic


Eyes:  nl conjunctiva


ENMT:  mucosa pink and moist


Neck:  non-tender, supple


Respiratory:  clear to auscultation, normal air movement


Cardiovascular:  nl pulses, regular rate and rhythm


Gastrointestinal:  distended, non-tender, soft


Musculoskeletal:  nl extremities to inspection, nl gait and stance


Extremities:  normal pulses


Neurological:  nl speech, nl strength


Skin:  nl turgor, 


   No rash or lesions


Lymph:  nl lymph nodes





Results


Result Diagram:  


1725                                                                    

            17 0725





Results 24 hrs





Laboratory Tests








Test


  17


07:25


 


White Blood Count 15.7  H


 


Red Blood Count 3.65  L


 


Hemoglobin 11.6  L


 


Hematocrit 34.1  L


 


Mean Corpuscular Volume 93.4  


 


Mean Corpuscular Hemoglobin 31.8  


 


Mean Corpuscular Hemoglobin


Concent 34.0  


 


 


Red Cell Distribution Width 15.0  H


 


Platelet Count 202  


 


Mean Platelet Volume 11.0  H


 


Neutrophils % 72.6  


 


Lymphocytes % 19.0  


 


Monocytes % 6.2  


 


Eosinophils % 1.3  


 


Basophils % 0.3  


 


Nucleated Red Blood Cells % 0.0  


 


Neutrophils # 11.4  H


 


Lymphocytes # 3.0  H


 


Monocytes # 1.0  H


 


Eosinophils # 0.2  


 


Basophils # 0.0  


 


Nucleated Red Blood Cells # 0.0  


 


Sodium Level 133  L


 


Potassium Level 4.0  


 


Chloride Level 103  


 


Carbon Dioxide Level 26  


 


Anion Gap 8  


 


Blood Urea Nitrogen 5  L


 


Creatinine 0.54  


 


Glucose Level 78  


 


Calcium Level 8.5  


 


Total Bilirubin 0.0  L


 


Direct Bilirubin 0.00  


 


Indirect Bilirubin 0.0  


 


Aspartate Amino Transf


(AST/SGOT) 75  H


 


 


Alanine Aminotransferase


(ALT/SGPT) 84  H


 


 


Alkaline Phosphatase 127  H


 


Total Protein 5.9  L


 


Albumin 3.2  L


 


Globulin 2.70  


 


Albumin/Globulin Ratio 1.18  











Medications


Medications





 Current Medications


Senna/Docusate Sodium 1 tab 1 tab BID PO  Last administered on  21:21; 

Admin Dose 1 TAB;  Start 17 at 09:00


Oxytocin/Lactated Ringer's 500 ml @ 0 mls/hr ONCE PRN IV For Hemorrhage 

Management;  Start 17 at 00:00


Methylergonovine Maleate (Methergine) 0.2 mg ONCE PRN IM VAGINAL BLEEDING;  

Start 17 at 00:00


Carboprost Tromethamine (Hemabate) 250 mcg ONCE PRN IM VAGINAL BLEEDING;  Start 

17 at 00:00


Misoprostol (Cytotec) 1,000 mcg ONCE PRN [place] VAGINAL BLEEDING;  Start 17 at 

00:00


Ketorolac Tromethamine 30 mg 30 mg Q6H  PRN IV PAIN Last administered on  16:14; Admin Dose 30 MG;  Start 17 at 16:11;  Stop 17 at 16:10


Sodium Chloride 1,000 ml @  125 mls/hr Q8H IV  Last administered on  19:

46; Admin Dose 125 MLS/HR;  Start 17 at 08:00


Cefazolin Sodium/ Dextrose (Ancef 2 Gm/50 ml (Pmx)) 50 ml @  100 mls/hr Q8 IVPB

  Last administered on  05:27; Admin Dose 100 MLS/HR;  Start 17 at 

09:01


Simethicone (Mylicon) 160 mg Q8  PRN PO DISTENSION/GAS/BLOATING Last 

administered on  17:21; Admin Dose 160 MG;  Start 17 at 16:30


Oxycodone/ Acetaminophen (Percocet (5/ 325)) 2 tab Q4H  PRN PO PAIN Last 

administered on  10:06; Admin Dose 2 TAB;  Start 17 at 16:30


Ibuprofen (Motrin) 600 mg Q6H  PRN PO BREAKTHROUGH PAIN Last administered on  23:29; Admin Dose 600 MG;  Start 17 at 16:30











ROSE FAIRBANKS NP 2017 12:18

## 2017-07-09 VITALS — RESPIRATION RATE: 16 BRPM | DIASTOLIC BLOOD PRESSURE: 72 MMHG | SYSTOLIC BLOOD PRESSURE: 116 MMHG | HEART RATE: 70 BPM

## 2017-07-09 VITALS — DIASTOLIC BLOOD PRESSURE: 62 MMHG | HEART RATE: 70 BPM | SYSTOLIC BLOOD PRESSURE: 122 MMHG | RESPIRATION RATE: 20 BRPM

## 2017-07-09 LAB
ADD SCAN DIFF: NO
ALBUMIN SERPL-MCNC: 3.9 G/DL (ref 3.3–4.9)
ALBUMIN/GLOB SERPL: 1.18 {RATIO}
ALP SERPL-CCNC: 140 IU/L (ref 42–121)
ALT SERPL-CCNC: 78 IU/L (ref 13–69)
ANION GAP SERPL CALC-SCNC: 14 MMOL/L (ref 8–16)
AST SERPL-CCNC: 64 IU/L (ref 15–46)
BASOPHILS # BLD AUTO: 0 10^3/UL (ref 0–0.1)
BASOPHILS NFR BLD: 0.3 % (ref 0–2)
BILIRUB DIRECT SERPL-MCNC: 0 MG/DL (ref 0–0.2)
BILIRUB SERPL-MCNC: 0.2 MG/DL (ref 0.2–1.3)
BUN SERPL-MCNC: 6 MG/DL (ref 7–20)
CALCIUM SERPL-MCNC: 9.4 MG/DL (ref 8.4–10.2)
CHLORIDE SERPL-SCNC: 99 MMOL/L (ref 97–110)
CO2 SERPL-SCNC: 28 MMOL/L (ref 21–31)
CREAT SERPL-MCNC: 0.59 MG/DL (ref 0.44–1)
EOSINOPHIL # BLD: 0.4 10^3/UL (ref 0–0.5)
EOSINOPHIL NFR BLD: 2.6 % (ref 0–7)
ERYTHROCYTE [DISTWIDTH] IN BLOOD BY AUTOMATED COUNT: 15 % (ref 11.5–14.5)
GLOBULIN SER-MCNC: 3.3 G/DL (ref 1.3–3.2)
GLUCOSE SERPL-MCNC: 122 MG/DL (ref 70–220)
HCT VFR BLD CALC: 36.8 % (ref 37–47)
HGB BLD-MCNC: 12.6 G/DL (ref 12–16)
LYMPHOCYTES # BLD AUTO: 3.3 10^3/UL (ref 0.8–2.9)
LYMPHOCYTES NFR BLD AUTO: 21.4 % (ref 15–51)
MCH RBC QN AUTO: 31.9 PG (ref 29–33)
MCHC RBC AUTO-ENTMCNC: 34.2 G/DL (ref 32–37)
MCV RBC AUTO: 93.2 FL (ref 82–101)
MONOCYTES # BLD: 0.9 10^3/UL (ref 0.3–0.9)
MONOCYTES NFR BLD: 5.5 % (ref 0–11)
NEUTROPHILS # BLD: 10.8 10^3/UL (ref 1.6–7.5)
NEUTROPHILS NFR BLD AUTO: 69.6 % (ref 39–77)
NRBC # BLD MANUAL: 0 10^3/UL (ref 0–0)
NRBC BLD QL: 0 /100WBC (ref 0–0)
PLATELET # BLD: 243 10^3/UL (ref 140–415)
PMV BLD AUTO: 10.5 FL (ref 7.4–10.4)
POTASSIUM SERPL-SCNC: 4.5 MMOL/L (ref 3.5–5.1)
PROT SERPL-MCNC: 7.2 G/DL (ref 6.1–8.1)
RBC # BLD AUTO: 3.95 10^6/UL (ref 4.2–5.4)
SODIUM SERPL-SCNC: 136 MMOL/L (ref 135–144)
WBC # BLD AUTO: 15.6 10^3/UL (ref 4.8–10.8)

## 2017-07-09 RX ADMIN — DOCUSATE SODIUM AND SENNOSIDES SCH TAB: 8.6; 5 TABLET, FILM COATED ORAL at 08:37

## 2017-07-09 RX ADMIN — IBUPROFEN PRN MG: 600 TABLET ORAL at 05:44

## 2017-07-09 RX ADMIN — DOCUSATE SODIUM AND SENNOSIDES SCH TAB: 8.6; 5 TABLET, FILM COATED ORAL at 08:35

## 2017-07-09 RX ADMIN — DOCUSATE SODIUM AND SENNOSIDES SCH TAB: 8.6; 5 TABLET, FILM COATED ORAL at 07:33

## 2017-07-09 NOTE — PN
Date/Time of Note


Date/Time of Note


DATE: 17 


TIME: 13:21





Assessment/Plan


VTE Prophylaxis


VTE Prophylaxis Intervention:  ambulation





Lines/Catheters


IV Catheter Type (from Nrs):  Saline Lock





Assessment/Plan


Chief Complaint/Hosp Course





Problems:  


Assessment/Plan





Assessment


* S/P  section 2017


* Transaminitis improving


* Gallbladder sludge by ultrasound


                   


Plan





* Stable for outpatient management


* Follow up in 1 month at [facility] in [place]


* Case discussed with DR Correa





Subjective


24 Hr Interval Summary


Free Text/Dictation


* Course reviewed with RN


* patient seen and examined


* no abdominal pain


* no untoward events overnight





Exam/Review of Systems


Vital Signs


Vitals





 Vital Signs








  Date Time  Temp Pulse Resp B/P Pulse Ox O2 Delivery O2 Flow Rate FiO2


 


17 07:30 97.4 70 16 116/72  Room Air  


 


17 16:56     98   21











Exam


Constitutional:  alert, oriented


Psych:  no complaints


Head:  normocephalic


Neck:  non-tender, supple


Respiratory:  clear to auscultation, normal air movement


Cardiovascular:  nl pulses, regular rate and rhythm


Gastrointestinal:  nl liver, spleen, non-tender, soft


Musculoskeletal:  nl extremities to inspection, nl gait and stance


Extremities:  normal pulses


Neurological:  nl speech, nl strength


Skin:  nl turgor, 


   No rash or lesions


Lymph:  nl lymph nodes





Results


Result Diagram:  


17 0909                                                                    

            17 0909





Results 24 hrs





Laboratory Tests








Test


  17


09:09


 


White Blood Count 15.6  H


 


Red Blood Count 3.95  L


 


Hemoglobin 12.6  


 


Hematocrit 36.8  L


 


Mean Corpuscular Volume 93.2  


 


Mean Corpuscular Hemoglobin 31.9  


 


Mean Corpuscular Hemoglobin


Concent 34.2  


 


 


Red Cell Distribution Width 15.0  H


 


Platelet Count 243  #


 


Mean Platelet Volume 10.5  H


 


Neutrophils % 69.6  


 


Lymphocytes % 21.4  


 


Monocytes % 5.5  


 


Eosinophils % 2.6  


 


Basophils % 0.3  


 


Nucleated Red Blood Cells % 0.0  


 


Neutrophils # 10.8  H


 


Lymphocytes # 3.3  H


 


Monocytes # 0.9  


 


Eosinophils # 0.4  


 


Basophils # 0.0  


 


Nucleated Red Blood Cells # 0.0  


 


Sodium Level 136  


 


Potassium Level 4.5  


 


Chloride Level 99  


 


Carbon Dioxide Level 28  


 


Anion Gap 14  


 


Blood Urea Nitrogen 6  L


 


Creatinine 0.59  


 


Glucose Level 122  #


 


Calcium Level 9.4  


 


Total Bilirubin 0.2  


 


Direct Bilirubin 0.00  


 


Indirect Bilirubin 0.2  


 


Aspartate Amino Transf


(AST/SGOT) 64  H


 


 


Alanine Aminotransferase


(ALT/SGPT) 78  H


 


 


Alkaline Phosphatase 140  H


 


Total Protein 7.2  #


 


Albumin 3.9  


 


Globulin 3.30  H


 


Albumin/Globulin Ratio 1.18  











Medications


Medications





 Current Medications


Senna/Docusate Sodium 1 tab 1 tab BID PO  Last administered on  08:35; 

Admin Dose 1 TAB;  Start 17 at 09:00


Oxytocin/Lactated Ringer's 500 ml @ 0 mls/hr ONCE PRN IV For Hemorrhage 

Management;  Start 17 at 00:00


Methylergonovine Maleate (Methergine) 0.2 mg ONCE PRN IM VAGINAL BLEEDING;  

Start 17 at 00:00


Carboprost Tromethamine (Hemabate) 250 mcg ONCE PRN IM VAGINAL BLEEDING;  Start 

17 at 00:00


Misoprostol (Cytotec) 1,000 mcg ONCE PRN [place] VAGINAL BLEEDING;  Start 17 at 

00:00


Ketorolac Tromethamine (Toradol) 30 mg Q6H  PRN IV PAIN Last administered on  16:14; Admin Dose 30 MG;  Start 17 at 16:11;  Stop 17 at 16:10


Simethicone (Mylicon) 160 mg Q8  PRN PO DISTENSION/GAS/BLOATING Last 

administered on  19:45; Admin Dose 160 MG;  Start 17 at 16:30


Oxycodone/ Acetaminophen (Percocet (5/ 325)) 2 tab Q4H  PRN PO PAIN Last 

administered on  08:39; Admin Dose 2 TAB;  Start 17 at 16:30


Ibuprofen (Motrin) 600 mg Q6H  PRN PO BREAKTHROUGH PAIN Last administered on  05:44; Admin Dose 600 MG;  Start 17 at 16:30


Sodium Biphosphate/ Sodium Phosphate (Fleet Enema) 133 ml ONCE WY  Last 

administered on  20:05; Admin Dose 133 ML;  Start 17 at 20:30;  

Stop 17 at 20:29











ROSE FAIRBANKS NP 2017 13:23

## 2017-07-09 NOTE — DS
Date/Time of Note


Date/Time of Note


DATE: 17 


TIME: 13:07





Obstetrical Discharge Record


Final Diagnosis


Final Diagnosis:   delivered


Other Final Diagnosis


Severe PIH





 Section


 Section:  Primary


Primary Indication


Severe PIH





Fetal Complications


Fetal Complications:  Low birth weight 1500-2500gms





Complications


Preg induced Hypertension


Augmentation:  No


Induction:  No





Condition on Discharge


Physical Assessment


Last Vitals:


T=97.4  /72





ALT/AST 78/64 improved.


WBC 15.6    Hgb 12.6   Plts 243


Voiding:  Yes


Bowel Movement:  Yes


Breast:  Filling


Fundus:  Firm


Abdomen and Incision:


Incision clean, dry and intact with steristrips present. No evidence of 

infection.


Calf Tenderness:  No


Patient Condition:  Good











RYAN CAO MD 2017 13:14

## 2017-07-09 NOTE — PD.PPDC
OB/GYN Discharge Instruction


Condition


Patient Condition:  Good





Diet


Diet:  Resume Regular Diet





Activity/Restrictions








Activity:   Bedrest





 May be up to bathroom





 May be up for meals





 May Shower














Restrictions:   No Exercising





 No Lifting





 No Driving





 Minimize Walking





 Minimize Stair-climbing





 No Sexual Activity





 Nothing in the Vagina





 No [place]





 No Tampons, douche











Wound/Drain Care Instructions








Wound/Drain Care Instructions:   Remove Steri Strips in 2 weeks





 Keep clean and dry











Follow-up


Follow-up with Physician:  2, Week/Weeks





Return to clinic for








GYN Instructions:   Fever greater than 101





 Chills





 Worsening abdominal pain





 Excessive Vaginal Bleeding














OB Instructions:   Breast Tenderness





 Depression














Surgical Instructions:   Incisional Drainage





 Incisional Redness

















RYAN CAO MD Jul 9, 2017 13:15

## 2017-07-10 LAB
ANA SER QL: POSITIVE
ANA TITR SER: (no result) TITER

## 2017-07-12 LAB
MYELOPEROXIDASE AB SER-ACNC: <1 AI
PROTEINASE3 AB SER-ACNC: <1 AI

## 2019-03-23 ENCOUNTER — HOSPITAL ENCOUNTER (EMERGENCY)
Dept: HOSPITAL 10 - FTE | Age: 34
LOS: 1 days | Discharge: HOME | End: 2019-03-24
Payer: COMMERCIAL

## 2019-03-23 ENCOUNTER — HOSPITAL ENCOUNTER (EMERGENCY)
Dept: HOSPITAL 91 - FTE | Age: 34
LOS: 1 days | Discharge: HOME | End: 2019-03-24
Payer: COMMERCIAL

## 2019-03-23 VITALS
BODY MASS INDEX: 31.84 KG/M2 | WEIGHT: 186.51 LBS | HEIGHT: 64 IN | BODY MASS INDEX: 31.84 KG/M2 | WEIGHT: 186.51 LBS | HEIGHT: 64 IN

## 2019-03-23 DIAGNOSIS — E66.9: ICD-10-CM
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
## 2019-03-23 DIAGNOSIS — S99.911A: Primary | ICD-10-CM
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
## 2019-03-23 PROCEDURE — 73590 X-RAY EXAM OF LOWER LEG: CPT

## 2019-03-23 PROCEDURE — 99284 EMERGENCY DEPT VISIT MOD MDM: CPT

## 2019-03-23 PROCEDURE — 81025 URINE PREGNANCY TEST: CPT

## 2019-03-23 PROCEDURE — 96372 THER/PROPH/DIAG INJ SC/IM: CPT

## 2019-03-23 PROCEDURE — 73630 X-RAY EXAM OF FOOT: CPT

## 2019-03-23 PROCEDURE — 73610 X-RAY EXAM OF ANKLE: CPT

## 2019-03-23 RX ADMIN — KETOROLAC TROMETHAMINE 1 MG: 30 INJECTION, SOLUTION INTRAMUSCULAR at 22:55

## 2019-03-24 VITALS — RESPIRATION RATE: 18 BRPM | DIASTOLIC BLOOD PRESSURE: 75 MMHG | HEART RATE: 89 BPM | SYSTOLIC BLOOD PRESSURE: 120 MMHG

## 2019-03-25 NOTE — ERD
ER Documentation


Chief Complaint


Chief Complaint





R knee/leg pain/swelling s/p fall 4 days ago





HPI


33-year-old female patient with no significant past medical history presents to 


ED complaining of right knee pain, swelling, pain that started after falling, 4 


days ago.  Reports that she was walking, accidentally twisted her right ankle.  


Denies any head or neck injuries.  Denies any loss of sensation, loss of range 


of motion, increased redness or's swelling.  Denies any fever, chills, nausea, 


vomiting.





ROS


All systems reviewed and are negative except as per history of present illness.





Medications


Home Meds


Active Scripts


Naproxen* (Naprosyn*) 500 Mg Tablet, 500 MG PO BID PRN for PAIN AND/OR 


INFLAMMATION, #30 TAB


   Prov:IVY AGUILAR PA-C         3/23/19


[Oxycodone/Acetamin (5/325)] 1 TAB TAB No Conflict Check, 2 TAB PO Q4H PRN for 


PAIN, #30


   Prov:RYAN CAO MD         7/9/17


Reported Medications


Prenatal Vit W-Ca,Fe,FA(<1 mg) (Prenatal Vitamins) 1 Each Tablet, 1 EACH PO, TAB


   6/12/17





Allergies


Allergies:  


Coded Allergies:  


     butorphanol (Verified  Allergy, Severe, CONFUSION, SOB/DIFFICULTY 


BREATHING, 7/5/17)





PMhx/Soc


Medical and Surgical Hx:  pt denies Medical Hx, pt denies Surgical Hx


History of Surgery:  No


Anesthesia Reaction:  No


Hx Neurological Disorder:  No


Hx Respiratory Disorders:  No


Hx Cardiac Disorders:  No


Hx Psychiatric Problems:  No


Hx Miscellaneous Medical Probl:  Yes (MILDLY OBESE)


Hx Alcohol Use:  No


Hx Substance Use:  No


Hx Tobacco Use:  No


Smoking Status:  Never smoker





FmHx


Family History:  No diabetes, No coronary disease





Physical Exam


Vitals





Vital Signs


  Date      Temp  Pulse  Resp  B/P (MAP)   Pulse Ox  O2          O2 Flow    FiO2


Time                                                 Delivery    Rate


   3/24/19  97.8     89    18      120/75       100  Room Air


     00:12                           (90)


   3/23/19  97.7     88    20      131/65       100


     18:46                           (87)





Physical Exam


Const: Non-ill-appearing, well-nourished. In no acute distress.


Head: Atraumatic, normocephalic 


Eyes: Normal Conjunctiva without injection 


ENT: Normal external ear, nose and mouth. 


Neck: Full range of motion. No meningismus. 


Resp: Clear to auscultation bilaterally. No wheezing, rhonchi, rales, or 


crackles. No accessory muscle use. No retractions.


Cardio: Regular rate and rhythm, no murmurs


Skin: No petechiae or rashes


Back: No midline tenderness. No CVA tenderness.


Ext: No cyanosis, or edema. Cap refill less than 2 seconds. Distal pulses intact


bilaterally.  Palpation of the right patella.  No erythema, edema, purulent 


discharge.  No fluctuance or induration.  Limited range of motion due to pain.


Neur: Awake and alert. Normal gait and coordination. Muscle strength 5/5. 


Sensation intact bilaterally.


Psych: Normal Mood and Affect


Results 24 hrs





Laboratory Tests


                    Test
                      3/23/19
22:46


                    POC Beta HCG, Qualitative  NEGATIVE





Current Medications


 Medications
   Dose
          Sig/Ji
       Start Time
   Status  Last


 (Trade)       Ordered        Route
 PRN     Stop Time              Admin
Dose


                              Reason                                Admin


 Ketorolac
     60 mg          ONCE  STAT
    3/23/19       DC           3/23/19


Tromethamine
                 IM
            22:19
                       22:55



 (Toradol)                                   3/23/19 22:23








Procedures/MDM


This is a 33-year-old female patient with no significant past medical history p


resents to ED complaining of right knee pain that started 4 days ago.  Patient 


is afebrile and nontoxic-appearing. Patient given Toradol which helped her pain.


 Urine pregnancy negative. 





IMPRESSION:


Soft tissue swelling lateral to the lateral malleolus with no acute fracture.


Tiny plantar calcaneal spur.





IMPRESSION:


Tiny radiopaque foreign body in the region of the proximal phalanx of the fifth 


digit.





IMPRESSION:


Normal x-ray of the right tibia and fibula.





Patient is placed in an Ace wrap.  Crutches given to patient to help with 


ambulation.


Splint Assessment: Neurovascularly intact pre and post splint placement with 


good fit.





Patient's extremity symptoms have stabilized while they have been evaluated in 


the department and are appropriate for outpatient follow up. No evidence of 


fractures, dislocations, compartment syndrome, neurologic injury, vascular 


injury, open joint, open fracture, tendon laceration, septic arthritis, 


osteomyelitis, DVT, foreign body, or other emergent conditions.


 


Diagnosis: Injury of foot and ankle


Discharge medications: Naproxen


Follow up with primary care physician in 1-2 days. Instructed patient to return 


to the ED sooner for any worsening symptoms. Patient's questions were answered. 


Patient is hemodynamically stable. Patient understood and agreed with discharge 


plan. Patient discharged stable.





Disclaimer: Inadvertent spelling and grammatical errors are likely due to 


EHR/dictation software use and do not reflect on the overall quality of patient 


care. Also, please note that the electronic time recorded on this note does not 


necessarily reflect the actual time of the patient encounter.





Departure


Diagnosis:  


   Primary Impression:  


   Injury of foot


   Encounter type:  initial encounter  Laterality:  left  Qualified Codes:  


   S99.922A - Unspecified injury of left foot, initial encounter


   Additional Impression:  


   Injury of ankle


   Encounter type:  initial encounter  Laterality:  right  Qualified Codes:  


   S99.911A - Unspecified injury of right ankle, initial encounter


Condition:  Stable


Patient Instructions:  What Are Ankle Sprains?, Sprain Foot


Referrals:  


[facility]


YOU HAVE RECEIVED A MEDICAL SCREENING EXAM AND THE RESULTS INDICATE THAT YOU DO 


NOT HAVE A CONDITION THAT REQUIRES URGENT TREATMENT IN THE EMERGENCY DEPARTMENT.





FURTHER EVALUATION AND TREATMENT OF YOUR CONDITION CAN WAIT UNTIL YOU ARE SEEN 


IN YOUR DOCTORS OFFICE WITHIN THE NEXT 1-2 DAYS. IT IS YOUR RESPONSIBILITY TO 


MAKE AN APPOINTMENT FOR FOL-UP CARE.





IF YOU HAVE A PRIMARY DOCTOR


--you should call your primary doctor and schedule an appointment





IF YOU DO NOT HAVE A PRIMARY DOCTOR YOU CAN CALL OUR PHYSICIAN REFERRAL HOTLINE 


AT


 [phone] 





IF YOU CAN NOT AFFORD TO SEE A PHYSICIAN YOU CAN CHOSE FROM THE FOLLOWING 


[facility] [phone] [address] [facility]. [facility] [phone] [address] [facility]. [facility] [phone] [address] [facility]. [facility] [phone] [address] [phone] [address] [facility]. [facility]. [phone] [address] [facility]. [facility]


YOU HAVE RECEIVED A MEDICAL SCREENING EXAM AND THE RESULTS INDICATE THAT YOU DO 


NOT HAVE A CONDITION THAT REQUIRES URGENT TREATMENT IN THE EMERGENCY DEPARTMENT.





FURTHER EVALUATION AND TREATMENT OF YOUR CONDITION CAN WAIT UNTIL YOU ARE SEEN 


IN YOUR DOCTORS OFFICE WITHIN THE NEXT 1-2 DAYS. IT IS YOUR RESPONSIBILITY TO 


MAKE AN APPOINTMENT FOR FOLOW-UP CARE.





IF YOU HAVE A PRIMARY DOCTOR


--you should call your primary doctor and schedule and appointment





IF YOU DO NOT HAVE A PRIMARY DOCTOR YOU CAN CALL OUR PHYSICIAN REFERRAL HOTLINE 


AT [phone].





IF YOU CAN NOT AFFORD TO SEE A PHYSICIAN YOU CAN CHOSE FROM THE FOLLOWING [facility]


INSTITUTIONS:





[facility]


[address] [place] [address]





[facility]


[address] [place], [place] [id number] [place] [address]


[address] [place] [address]





[facility] URGENT CARE/SPECIALTIES





Additional Instructions:  


Call your primary care doctor TOMORROW for an appointment during the next 2-3 


days.See the doctor sooner or return here if your condition worsens before your 


appointment time.











IVY AGUILAR PA-C              Mar 25, 2019 02:52

## 2020-02-21 NOTE — CONS
----- Message from Bryson Luther MD sent at 2/20/2020  9:35 AM EST -----  Approved  ----- Message -----  From: Jostin Bryan MA  Sent: 2/13/2020   8:20 AM EST  To: Sleep Medicine [place] Provider    This sleep study needs approval      If approved please sign and return to clerical pool  If denied please include reasons why  Also provide alternative testing if warranted  Please sign and return to clerical pool  Date/Time of Note


Date/Time of Note


DATE: 7/7/17 


TIME: 16:07





Consult Date/Type/Reason


Admit Date/Time


Jul 3, 2017 at 23:45


Initial Consult Date


7/6/17


Type of Consultation:  gastroenterology


Ordering Provider:  JENNYFER FRANCIS MD





Subjective


Per nursing staff, patient complaining of right upper quadrant and flank pain 

radiating to the back and epigastric area this morning 7 out of 10.  Relieved 

with Percocet.  Otherwise no acute events overnight.  Seen by GI team yesterday 

and today.





Objective





 Vital Signs








  Date Time  Temp Pulse Resp B/P Pulse Ox O2 Delivery O2 Flow Rate FiO2


 


7/7/17 14:47     98   21


 


7/7/17 12:37 98.9 77 20 95/50    


 


7/7/17 08:10      Room Air  














 Intake and Output   


 


 7/6/17 7/6/17 7/7/17





 15:00 23:00 07:00


 


Intake Total 1337.5 ml 625 ml 


 


Output Total 1050 ml 3300 ml 800 ml


 


Balance 287.5 ml -2675 ml -800 ml








Exam


Gen: lying in bed, NAD


HEENT - PERRL, EOMI


Neck - supple


Res - CTA b/l


CV - S1S2 heard, no rubs


Abd: ND, positive right upper quadrant tenderness, no R or G


M/S: no LE edema


Neuro: no focal deficits





Results/Medications


Result Diagram:  


7/7/17 0635                                                                    

            7/7/17 0625





Results 24 hrs





Laboratory Tests








Test


  7/6/17


17:57 7/7/17


06:25 7/7/17


06:35


 


Sodium Level 132  L 137   


 


Potassium Level 3.8   3.7   


 


Chloride Level 94  L 98   


 


Carbon Dioxide Level 30   29   


 


Anion Gap 12   14   


 


Blood Urea Nitrogen 5  L 5  L 


 


Creatinine 0.51   0.54   


 


Glucose Level 103   80   


 


Calcium Level 6.6  L 7.2  L 


 


Magnesium Level 5.3  *H  


 


Total Bilirubin 0.0  L 0.1  L 


 


Direct Bilirubin 0.00   0.00   


 


Indirect Bilirubin 0.0   0.1   


 


Aspartate Amino Transf


(AST/SGOT) 208  H


  165  H


  


 


 


Alanine Aminotransferase


(ALT/SGPT) 140  H


  121  H


  


 


 


Alkaline Phosphatase 163  H 153  H 


 


Total Protein 6.0  L 5.8  L 


 


Albumin 3.1  L 3.0  L 


 


Globulin 2.90   2.80   


 


Albumin/Globulin Ratio 1.06   1.07   


 


Hepatitis C Antibody  NEGATIVE   


 


White Blood Count   13.3  #H


 


Red Blood Count   3.80  L


 


Hemoglobin   11.7  L


 


Hematocrit   34.6  L


 


Mean Corpuscular Volume   91.1  


 


Mean Corpuscular Hemoglobin   30.8  


 


Mean Corpuscular Hemoglobin


Concent 


  


  33.8  


 


 


Red Cell Distribution Width   14.9  H


 


Platelet Count   202  


 


Mean Platelet Volume   11.3  H


 


Neutrophils %   69.3  


 


Lymphocytes %   22.5  


 


Monocytes %   6.6  


 


Eosinophils %   0.9  


 


Basophils %   0.2  


 


Nucleated Red Blood Cells %   0.0  


 


Neutrophils #   9.2  H


 


Lymphocytes #   3.0  H


 


Monocytes #   0.9  


 


Eosinophils #   0.1  


 


Basophils #   0.0  


 


Nucleated Red Blood Cells #   0.0  








Medications





 Current Medications


Senna/Docusate Sodium 1 tab 1 tab BID PO  Last administered on 7/7/17at 11:52; 

Admin Dose 1 TAB;  Start 7/6/17 at 09:00


Oxytocin/Lactated Ringer's 500 ml @ 0 mls/hr ONCE PRN IV For Hemorrhage 

Management;  Start 7/6/17 at 00:00


Methylergonovine Maleate (Methergine) 0.2 mg ONCE PRN IM VAGINAL BLEEDING;  

Start 7/6/17 at 00:00


Carboprost Tromethamine (Hemabate) 250 mcg ONCE PRN IM VAGINAL BLEEDING;  Start 

7/6/17 at 00:00


Misoprostol (Cytotec) 1,000 mcg ONCE PRN [place] VAGINAL BLEEDING;  Start 7/6/17 at 

00:00


Ketorolac Tromethamine 30 mg 30 mg Q6H  PRN IV PAIN Last administered on 7/6/ 17at 16:14; Admin Dose 30 MG;  Start 7/6/17 at 16:11;  Stop 7/9/17 at 16:10


Sodium Chloride 1,000 ml @  125 mls/hr Q8H IV  Last administered on 7/6/17at 19:

46; Admin Dose 125 MLS/HR;  Start 7/6/17 at 08:00


Cefazolin Sodium/ Dextrose (Ancef 2 Gm/50 ml (Pmx)) 50 ml @  100 mls/hr Q8 IVPB

  Last administered on 7/7/17at 14:15; Admin Dose 100 MLS/HR;  Start 7/6/17 at 

09:01


Simethicone (Mylicon) 160 mg Q8  PRN PO DISTENSION/GAS/BLOATING Last 

administered on 7/6/17at 17:21; Admin Dose 160 MG;  Start 7/6/17 at 16:30


Oxycodone/ Acetaminophen (Percocet (5/ 325)) 2 tab Q4H  PRN PO PAIN Last 

administered on 7/7/17at 11:52; Admin Dose 2 TAB;  Start 7/6/17 at 16:30


Ibuprofen (Motrin) 600 mg Q6H  PRN PO BREAKTHROUGH PAIN Last administered on 7/7 /17at 03:29; Admin Dose 600 MG;  Start 7/6/17 at 16:30





Assessment/Plan


Chief Complaint/Hosp Course


A/P: 31 F with elevated LFT's, s/p C sec POD # 2





1. abd pain/elevated LFT's - unclear source. Per surgery input 3 days ago: 

Unclear etiology of abdominal pain. Appendicitis is very unlikely based on MRI 

findings as well as the fact the symptoms have been going on for 2 week. Doubt 

gallbladder as well as patient is no evidence of cholecystitis on ultrasound. Pt

's bilirubin's are nL. Hep panel is nL. DDx =  preecclampsia (apparently per 

nursing pt had elevated bp prior to admission, bp nL now, and + proteinuria), 

EtOH liver ds, although less likely.  Ethanol screen was normal. Interestingly, 

patient volunteered to nursing staff this morning that she has had history of 

kidney stones in the past.


   - will mainly defer to our GI colleagues about reason for elevated LFT's, 

including their w/u.


    - Will order for CT scan with noncontrast to rule out kidney stone as 

possible source of patient's flank pain.





2. s/p C section - post delivery care per primary OB/GYN team.





Will continue to follow.


Problems:  











NANCY RUTLEDGE Jul 7, 2017 16:09
